# Patient Record
Sex: FEMALE | Race: ASIAN | NOT HISPANIC OR LATINO
[De-identification: names, ages, dates, MRNs, and addresses within clinical notes are randomized per-mention and may not be internally consistent; named-entity substitution may affect disease eponyms.]

---

## 2023-08-28 PROBLEM — Z00.00 ENCOUNTER FOR PREVENTIVE HEALTH EXAMINATION: Status: ACTIVE | Noted: 2023-08-28

## 2023-08-29 ENCOUNTER — APPOINTMENT (OUTPATIENT)
Dept: OBGYN | Facility: CLINIC | Age: 33
End: 2023-08-29
Payer: COMMERCIAL

## 2023-08-29 ENCOUNTER — NON-APPOINTMENT (OUTPATIENT)
Age: 33
End: 2023-08-29

## 2023-08-29 VITALS
HEIGHT: 62 IN | DIASTOLIC BLOOD PRESSURE: 81 MMHG | OXYGEN SATURATION: 100 % | BODY MASS INDEX: 21.53 KG/M2 | HEART RATE: 73 BPM | WEIGHT: 117 LBS | SYSTOLIC BLOOD PRESSURE: 121 MMHG

## 2023-08-29 DIAGNOSIS — Z78.9 OTHER SPECIFIED HEALTH STATUS: ICD-10-CM

## 2023-08-29 DIAGNOSIS — Z32.00 ENCOUNTER FOR PREGNANCY TEST, RESULT UNKNOWN: ICD-10-CM

## 2023-08-29 PROCEDURE — 76830 TRANSVAGINAL US NON-OB: CPT

## 2023-08-29 PROCEDURE — 99204 OFFICE O/P NEW MOD 45 MIN: CPT | Mod: 25

## 2023-08-29 PROCEDURE — 36415 COLL VENOUS BLD VENIPUNCTURE: CPT

## 2023-08-29 NOTE — HISTORY OF PRESENT ILLNESS
[Patient reported PAP Smear was normal] : Patient reported PAP Smear was normal [N] : Patient does not use contraception [Y] : Positive pregnancy history [Frequency: Q ___ days] : menstrual periods occur approximately every [unfilled] days [Menarche Age: ____] : age at menarche was [unfilled] [HCG+: ___(Date)] : a positive HCG was recorded on [unfilled] [No] : Patient does not have concerns regarding sex [Currently Active] : currently active [Men] : men [Vaginal] : vaginal [PapSmeardate] : 06/23 [LMPDate] : 07/07/23 [MensesFreq] : 28-30 [MensesLength] : 4-5 [PGxTotal] : 1 [FreeTextEntry1] : 07/07/23

## 2023-09-12 LAB
ABO + RH PNL BLD: NORMAL
ALBUMIN SERPL ELPH-MCNC: 5.2 G/DL
ALP BLD-CCNC: 37 U/L
ALT SERPL-CCNC: 10 U/L
ANION GAP SERPL CALC-SCNC: 17 MMOL/L
AR GENE MUT ANL BLD/T: NORMAL
AST SERPL-CCNC: 18 U/L
BACTERIA UR CULT: ABNORMAL
BILIRUB SERPL-MCNC: 0.5 MG/DL
BLD GP AB SCN SERPL QL: NORMAL
BUN SERPL-MCNC: 5 MG/DL
C TRACH RRNA SPEC QL NAA+PROBE: NOT DETECTED
CALCIUM SERPL-MCNC: 10.3 MG/DL
CFTR MUT TESTED BLD/T: NEGATIVE
CHLORIDE SERPL-SCNC: 102 MMOL/L
CMV IGG SERPL QL: >10 U/ML
CMV IGG SERPL-IMP: POSITIVE
CMV IGM SERPL QL: <8 AU/ML
CMV IGM SERPL QL: NEGATIVE
CO2 SERPL-SCNC: 20 MMOL/L
CREAT SERPL-MCNC: 0.57 MG/DL
EGFR: 125 ML/MIN/1.73M2
ESTIMATED AVERAGE GLUCOSE: 97 MG/DL
FMR1 GENE MUT ANL BLD/T: NORMAL
GLUCOSE SERPL-MCNC: 84 MG/DL
HBA1C MFR BLD HPLC: 5 %
HBV SURFACE AG SER QL: NONREACTIVE
HCV AB SER QL: NONREACTIVE
HCV S/CO RATIO: 0.24 S/CO
HGB A MFR BLD: 97.5 %
HGB A2 MFR BLD: 2.5 %
HGB FRACT BLD-IMP: NORMAL
HIV1+2 AB SPEC QL IA.RAPID: NONREACTIVE
MEV IGG FLD QL IA: 44 AU/ML
MEV IGG+IGM SER-IMP: POSITIVE
MUV AB SER-ACNC: POSITIVE
MUV IGG SER QL IA: >300 AU/ML
N GONORRHOEA RRNA SPEC QL NAA+PROBE: NOT DETECTED
POTASSIUM SERPL-SCNC: 4 MMOL/L
PROT SERPL-MCNC: 8.1 G/DL
RUBV IGG FLD-ACNC: 17 INDEX
RUBV IGG SER-IMP: POSITIVE
SODIUM SERPL-SCNC: 139 MMOL/L
SOURCE AMPLIFICATION: NORMAL
T PALLIDUM AB SER QL IA: NEGATIVE
TSH SERPL-ACNC: 0.93 UIU/ML
VZV AB TITR SER: POSITIVE
VZV IGG SER IF-ACNC: 2171 INDEX

## 2023-09-19 ENCOUNTER — APPOINTMENT (OUTPATIENT)
Dept: OBGYN | Facility: CLINIC | Age: 33
End: 2023-09-19
Payer: COMMERCIAL

## 2023-09-19 ENCOUNTER — TRANSCRIPTION ENCOUNTER (OUTPATIENT)
Age: 33
End: 2023-09-19

## 2023-09-19 VITALS
BODY MASS INDEX: 21.35 KG/M2 | OXYGEN SATURATION: 100 % | WEIGHT: 116 LBS | HEIGHT: 62 IN | SYSTOLIC BLOOD PRESSURE: 114 MMHG | DIASTOLIC BLOOD PRESSURE: 72 MMHG | HEART RATE: 86 BPM

## 2023-09-19 DIAGNOSIS — O23.40 UNSPECIFIED INFECTION OF URINARY TRACT IN PREGNANCY, UNSPECIFIED TRIMESTER: ICD-10-CM

## 2023-09-19 DIAGNOSIS — Z34.91 ENCOUNTER FOR SUPERVISION OF NORMAL PREGNANCY, UNSPECIFIED, FIRST TRIMESTER: ICD-10-CM

## 2023-09-19 PROCEDURE — 81003 URINALYSIS AUTO W/O SCOPE: CPT | Mod: NC,QW

## 2023-09-19 PROCEDURE — 36415 COLL VENOUS BLD VENIPUNCTURE: CPT

## 2023-09-19 PROCEDURE — 76830 TRANSVAGINAL US NON-OB: CPT

## 2023-09-19 PROCEDURE — 0500F INITIAL PRENATAL CARE VISIT: CPT | Mod: 25

## 2023-09-29 ENCOUNTER — APPOINTMENT (OUTPATIENT)
Dept: ANTEPARTUM | Facility: CLINIC | Age: 33
End: 2023-09-29
Payer: COMMERCIAL

## 2023-09-29 ENCOUNTER — ASOB RESULT (OUTPATIENT)
Age: 33
End: 2023-09-29

## 2023-09-29 PROCEDURE — 36415 COLL VENOUS BLD VENIPUNCTURE: CPT

## 2023-09-29 PROCEDURE — 76813 OB US NUCHAL MEAS 1 GEST: CPT

## 2023-09-29 PROCEDURE — 93976 VASCULAR STUDY: CPT

## 2023-10-01 ENCOUNTER — TRANSCRIPTION ENCOUNTER (OUTPATIENT)
Age: 33
End: 2023-10-01

## 2023-10-01 LAB — BACTERIA UR CULT: NORMAL

## 2023-10-06 PROBLEM — Z78.9 NO FAMILY HISTORY OF CANCER: Status: ACTIVE | Noted: 2023-10-06

## 2023-10-17 ENCOUNTER — APPOINTMENT (OUTPATIENT)
Dept: OBGYN | Facility: CLINIC | Age: 33
End: 2023-10-17
Payer: COMMERCIAL

## 2023-10-17 VITALS
DIASTOLIC BLOOD PRESSURE: 82 MMHG | OXYGEN SATURATION: 100 % | BODY MASS INDEX: 22.13 KG/M2 | SYSTOLIC BLOOD PRESSURE: 120 MMHG | HEART RATE: 83 BPM | WEIGHT: 121 LBS

## 2023-10-17 PROCEDURE — 81003 URINALYSIS AUTO W/O SCOPE: CPT | Mod: NC,QW

## 2023-10-17 PROCEDURE — 0502F SUBSEQUENT PRENATAL CARE: CPT

## 2023-10-27 ENCOUNTER — APPOINTMENT (OUTPATIENT)
Dept: OBGYN | Facility: CLINIC | Age: 33
End: 2023-10-27
Payer: COMMERCIAL

## 2023-10-27 VITALS
WEIGHT: 123 LBS | SYSTOLIC BLOOD PRESSURE: 111 MMHG | BODY MASS INDEX: 22.5 KG/M2 | OXYGEN SATURATION: 99 % | HEART RATE: 78 BPM | DIASTOLIC BLOOD PRESSURE: 67 MMHG

## 2023-10-27 DIAGNOSIS — Z13.79 ENCOUNTER FOR OTHER SCREENING FOR GENETIC AND CHROMOSOMAL ANOMALIES: ICD-10-CM

## 2023-10-27 PROCEDURE — 81003 URINALYSIS AUTO W/O SCOPE: CPT | Mod: NC,QW

## 2023-10-27 PROCEDURE — 0502F SUBSEQUENT PRENATAL CARE: CPT

## 2023-10-27 PROCEDURE — 36415 COLL VENOUS BLD VENIPUNCTURE: CPT

## 2023-11-06 LAB
AFP MOM: 1.11
AFP VALUE: 43 NG/ML
ALPHA FETOPROTEIN SERUM COMMENT: NORMAL
ALPHA FETOPROTEIN SERUM INTERPRETATION: NORMAL
ALPHA FETOPROTEIN SERUM RESULTS: NORMAL
ALPHA FETOPROTEIN SERUM TEST RESULTS: NORMAL
GESTATIONAL AGE BASED ON: NORMAL
GESTATIONAL AGE ON COLLECTION DATE: 16 WEEKS
INSULIN DEP DIABETES: NO
MATERNAL AGE AT EDD AFP: 31.5 YR
MULTIPLE GESTATION: NO
OSBR RISK 1 IN: 8647
RACE: NORMAL
WEIGHT AFP: 123 LBS

## 2023-11-27 ENCOUNTER — ASOB RESULT (OUTPATIENT)
Age: 33
End: 2023-11-27

## 2023-11-27 ENCOUNTER — APPOINTMENT (OUTPATIENT)
Dept: ANTEPARTUM | Facility: CLINIC | Age: 33
End: 2023-11-27
Payer: COMMERCIAL

## 2023-11-27 PROCEDURE — 76817 TRANSVAGINAL US OBSTETRIC: CPT

## 2023-11-27 PROCEDURE — 76811 OB US DETAILED SNGL FETUS: CPT

## 2023-12-01 ENCOUNTER — NON-APPOINTMENT (OUTPATIENT)
Age: 33
End: 2023-12-01

## 2023-12-05 ENCOUNTER — NON-APPOINTMENT (OUTPATIENT)
Age: 33
End: 2023-12-05

## 2023-12-05 ENCOUNTER — APPOINTMENT (OUTPATIENT)
Dept: OBGYN | Facility: CLINIC | Age: 33
End: 2023-12-05
Payer: COMMERCIAL

## 2023-12-05 VITALS
BODY MASS INDEX: 23.75 KG/M2 | OXYGEN SATURATION: 100 % | SYSTOLIC BLOOD PRESSURE: 105 MMHG | HEART RATE: 80 BPM | WEIGHT: 129.88 LBS | DIASTOLIC BLOOD PRESSURE: 68 MMHG

## 2023-12-05 PROCEDURE — 0502F SUBSEQUENT PRENATAL CARE: CPT

## 2024-01-08 ENCOUNTER — APPOINTMENT (OUTPATIENT)
Dept: ANTEPARTUM | Facility: CLINIC | Age: 34
End: 2024-01-08
Payer: COMMERCIAL

## 2024-01-08 ENCOUNTER — ASOB RESULT (OUTPATIENT)
Age: 34
End: 2024-01-08

## 2024-01-08 PROCEDURE — 76820 UMBILICAL ARTERY ECHO: CPT | Mod: 59

## 2024-01-08 PROCEDURE — 76819 FETAL BIOPHYS PROFIL W/O NST: CPT

## 2024-01-08 PROCEDURE — 76816 OB US FOLLOW-UP PER FETUS: CPT

## 2024-01-09 ENCOUNTER — APPOINTMENT (OUTPATIENT)
Dept: OBGYN | Facility: CLINIC | Age: 34
End: 2024-01-09
Payer: COMMERCIAL

## 2024-01-09 VITALS
BODY MASS INDEX: 24.88 KG/M2 | OXYGEN SATURATION: 100 % | DIASTOLIC BLOOD PRESSURE: 84 MMHG | HEART RATE: 75 BPM | WEIGHT: 136 LBS | SYSTOLIC BLOOD PRESSURE: 128 MMHG

## 2024-01-09 DIAGNOSIS — Z13.1 ENCOUNTER FOR SCREENING FOR DIABETES MELLITUS: ICD-10-CM

## 2024-01-09 PROCEDURE — 81003 URINALYSIS AUTO W/O SCOPE: CPT | Mod: NC,QW

## 2024-01-09 PROCEDURE — 0502F SUBSEQUENT PRENATAL CARE: CPT

## 2024-01-09 PROCEDURE — 36415 COLL VENOUS BLD VENIPUNCTURE: CPT

## 2024-01-13 ENCOUNTER — TRANSCRIPTION ENCOUNTER (OUTPATIENT)
Age: 34
End: 2024-01-13

## 2024-01-14 LAB
BASOPHILS # BLD AUTO: 0.03 K/UL
BASOPHILS NFR BLD AUTO: 0.3 %
EOSINOPHIL # BLD AUTO: 0.11 K/UL
EOSINOPHIL NFR BLD AUTO: 1.2 %
GLUCOSE 1H P 50 G GLC PO SERPL-MCNC: 107 MG/DL
HCT VFR BLD CALC: 34 %
HGB BLD-MCNC: 10.7 G/DL
IMM GRANULOCYTES NFR BLD AUTO: 0.6 %
LYMPHOCYTES # BLD AUTO: 1.4 K/UL
LYMPHOCYTES NFR BLD AUTO: 15.6 %
MAN DIFF?: NORMAL
MCHC RBC-ENTMCNC: 31.1 PG
MCHC RBC-ENTMCNC: 31.5 GM/DL
MCV RBC AUTO: 98.8 FL
MONOCYTES # BLD AUTO: 0.64 K/UL
MONOCYTES NFR BLD AUTO: 7.1 %
NEUTROPHILS # BLD AUTO: 6.74 K/UL
NEUTROPHILS NFR BLD AUTO: 75.2 %
PLATELET # BLD AUTO: 177 K/UL
RBC # BLD: 3.44 M/UL
RBC # FLD: 14.5 %
T PALLIDUM AB SER QL IA: NEGATIVE
WBC # FLD AUTO: 8.97 K/UL

## 2024-01-17 ENCOUNTER — TRANSCRIPTION ENCOUNTER (OUTPATIENT)
Age: 34
End: 2024-01-17

## 2024-01-24 ENCOUNTER — TRANSCRIPTION ENCOUNTER (OUTPATIENT)
Age: 34
End: 2024-01-24

## 2024-02-05 ENCOUNTER — APPOINTMENT (OUTPATIENT)
Dept: ANTEPARTUM | Facility: CLINIC | Age: 34
End: 2024-02-05
Payer: COMMERCIAL

## 2024-02-05 ENCOUNTER — ASOB RESULT (OUTPATIENT)
Age: 34
End: 2024-02-05

## 2024-02-05 PROCEDURE — 76816 OB US FOLLOW-UP PER FETUS: CPT

## 2024-02-05 PROCEDURE — 76820 UMBILICAL ARTERY ECHO: CPT | Mod: 59

## 2024-02-05 PROCEDURE — 76819 FETAL BIOPHYS PROFIL W/O NST: CPT

## 2024-02-06 ENCOUNTER — NON-APPOINTMENT (OUTPATIENT)
Age: 34
End: 2024-02-06

## 2024-02-14 ENCOUNTER — APPOINTMENT (OUTPATIENT)
Dept: OBGYN | Facility: CLINIC | Age: 34
End: 2024-02-14
Payer: COMMERCIAL

## 2024-02-14 VITALS
DIASTOLIC BLOOD PRESSURE: 79 MMHG | SYSTOLIC BLOOD PRESSURE: 128 MMHG | HEART RATE: 106 BPM | WEIGHT: 145 LBS | BODY MASS INDEX: 26.52 KG/M2 | OXYGEN SATURATION: 97 %

## 2024-02-14 DIAGNOSIS — Z23 ENCOUNTER FOR IMMUNIZATION: ICD-10-CM

## 2024-02-14 PROCEDURE — 90471 IMMUNIZATION ADMIN: CPT

## 2024-02-14 PROCEDURE — 90715 TDAP VACCINE 7 YRS/> IM: CPT

## 2024-02-14 PROCEDURE — 0502F SUBSEQUENT PRENATAL CARE: CPT

## 2024-02-14 PROCEDURE — 81003 URINALYSIS AUTO W/O SCOPE: CPT | Mod: QW

## 2024-02-26 ENCOUNTER — TRANSCRIPTION ENCOUNTER (OUTPATIENT)
Age: 34
End: 2024-02-26

## 2024-02-26 ENCOUNTER — APPOINTMENT (OUTPATIENT)
Dept: ANTEPARTUM | Facility: CLINIC | Age: 34
End: 2024-02-26
Payer: COMMERCIAL

## 2024-02-26 ENCOUNTER — ASOB RESULT (OUTPATIENT)
Age: 34
End: 2024-02-26

## 2024-02-26 PROCEDURE — 76820 UMBILICAL ARTERY ECHO: CPT | Mod: 59

## 2024-02-26 PROCEDURE — 76819 FETAL BIOPHYS PROFIL W/O NST: CPT

## 2024-02-26 PROCEDURE — 76816 OB US FOLLOW-UP PER FETUS: CPT

## 2024-02-29 ENCOUNTER — NON-APPOINTMENT (OUTPATIENT)
Age: 34
End: 2024-02-29

## 2024-03-05 ENCOUNTER — APPOINTMENT (OUTPATIENT)
Dept: OBGYN | Facility: CLINIC | Age: 34
End: 2024-03-05
Payer: COMMERCIAL

## 2024-03-05 VITALS
OXYGEN SATURATION: 97 % | DIASTOLIC BLOOD PRESSURE: 70 MMHG | HEIGHT: 62 IN | SYSTOLIC BLOOD PRESSURE: 125 MMHG | WEIGHT: 151 LBS | HEART RATE: 72 BPM | BODY MASS INDEX: 27.79 KG/M2

## 2024-03-05 PROCEDURE — 0502F SUBSEQUENT PRENATAL CARE: CPT

## 2024-03-05 PROCEDURE — 81003 URINALYSIS AUTO W/O SCOPE: CPT | Mod: QW

## 2024-03-13 ENCOUNTER — NON-APPOINTMENT (OUTPATIENT)
Age: 34
End: 2024-03-13

## 2024-03-14 ENCOUNTER — APPOINTMENT (OUTPATIENT)
Dept: ANTEPARTUM | Facility: CLINIC | Age: 34
End: 2024-03-14
Payer: COMMERCIAL

## 2024-03-14 ENCOUNTER — ASOB RESULT (OUTPATIENT)
Age: 34
End: 2024-03-14

## 2024-03-14 ENCOUNTER — TRANSCRIPTION ENCOUNTER (OUTPATIENT)
Age: 34
End: 2024-03-14

## 2024-03-14 PROCEDURE — 76819 FETAL BIOPHYS PROFIL W/O NST: CPT

## 2024-03-14 PROCEDURE — 76820 UMBILICAL ARTERY ECHO: CPT | Mod: 59

## 2024-03-14 PROCEDURE — 76816 OB US FOLLOW-UP PER FETUS: CPT

## 2024-03-17 PROBLEM — Z23 ENCOUNTER FOR IMMUNIZATION: Status: ACTIVE | Noted: 2024-03-17

## 2024-03-19 ENCOUNTER — APPOINTMENT (OUTPATIENT)
Dept: OBGYN | Facility: CLINIC | Age: 34
End: 2024-03-19
Payer: COMMERCIAL

## 2024-03-19 VITALS
HEART RATE: 72 BPM | WEIGHT: 150 LBS | DIASTOLIC BLOOD PRESSURE: 86 MMHG | OXYGEN SATURATION: 100 % | BODY MASS INDEX: 27.44 KG/M2 | SYSTOLIC BLOOD PRESSURE: 134 MMHG

## 2024-03-19 PROCEDURE — 81003 URINALYSIS AUTO W/O SCOPE: CPT | Mod: QW

## 2024-03-19 PROCEDURE — 0502F SUBSEQUENT PRENATAL CARE: CPT

## 2024-03-19 PROCEDURE — 36415 COLL VENOUS BLD VENIPUNCTURE: CPT

## 2024-03-20 LAB
ALBUMIN SERPL ELPH-MCNC: 3.8 G/DL
ALP BLD-CCNC: 180 U/L
ALT SERPL-CCNC: 30 U/L
ANION GAP SERPL CALC-SCNC: 12 MMOL/L
AST SERPL-CCNC: 28 U/L
BASOPHILS # BLD AUTO: 0.06 K/UL
BASOPHILS NFR BLD AUTO: 0.8 %
BILIRUB SERPL-MCNC: 0.3 MG/DL
BUN SERPL-MCNC: 6 MG/DL
CALCIUM SERPL-MCNC: 8.8 MG/DL
CHLORIDE SERPL-SCNC: 105 MMOL/L
CO2 SERPL-SCNC: 20 MMOL/L
CREAT SERPL-MCNC: 0.71 MG/DL
CREAT SPEC-SCNC: 93 MG/DL
CREAT/PROT UR: 0.4 RATIO
EGFR: 117 ML/MIN/1.73M2
EOSINOPHIL # BLD AUTO: 0.12 K/UL
EOSINOPHIL NFR BLD AUTO: 1.5 %
GLUCOSE SERPL-MCNC: 94 MG/DL
HBV SURFACE AG SER QL: NONREACTIVE
HCT VFR BLD CALC: 34.3 %
HGB BLD-MCNC: 10.3 G/DL
HIV1+2 AB SPEC QL IA.RAPID: NONREACTIVE
IMM GRANULOCYTES NFR BLD AUTO: 0.4 %
LYMPHOCYTES # BLD AUTO: 1.47 K/UL
LYMPHOCYTES NFR BLD AUTO: 18.6 %
MAN DIFF?: NORMAL
MCHC RBC-ENTMCNC: 27.5 PG
MCHC RBC-ENTMCNC: 30 GM/DL
MCV RBC AUTO: 91.7 FL
MONOCYTES # BLD AUTO: 0.56 K/UL
MONOCYTES NFR BLD AUTO: 7.1 %
NEUTROPHILS # BLD AUTO: 5.68 K/UL
NEUTROPHILS NFR BLD AUTO: 71.6 %
PLATELET # BLD AUTO: 217 K/UL
POTASSIUM SERPL-SCNC: 4.4 MMOL/L
PROT SERPL-MCNC: 6.9 G/DL
PROT UR-MCNC: 38 MG/DL
RBC # BLD: 3.74 M/UL
RBC # FLD: 13.9 %
SODIUM SERPL-SCNC: 137 MMOL/L
URATE SERPL-MCNC: 4.3 MG/DL
WBC # FLD AUTO: 7.92 K/UL

## 2024-03-21 ENCOUNTER — TRANSCRIPTION ENCOUNTER (OUTPATIENT)
Age: 34
End: 2024-03-21

## 2024-03-21 ENCOUNTER — INPATIENT (INPATIENT)
Facility: HOSPITAL | Age: 34
LOS: 2 days | Discharge: HOME | End: 2024-03-24
Attending: OBSTETRICS & GYNECOLOGY | Admitting: OBSTETRICS & GYNECOLOGY
Payer: COMMERCIAL

## 2024-03-21 VITALS
RESPIRATION RATE: 18 BRPM | SYSTOLIC BLOOD PRESSURE: 150 MMHG | DIASTOLIC BLOOD PRESSURE: 71 MMHG | TEMPERATURE: 99 F | HEART RATE: 85 BPM

## 2024-03-21 DIAGNOSIS — O26.899 OTHER SPECIFIED PREGNANCY RELATED CONDITIONS, UNSPECIFIED TRIMESTER: ICD-10-CM

## 2024-03-21 LAB
ALBUMIN SERPL ELPH-MCNC: 4 G/DL — SIGNIFICANT CHANGE UP (ref 3.3–5)
ALP SERPL-CCNC: 192 U/L — HIGH (ref 40–120)
ALT FLD-CCNC: 36 U/L — SIGNIFICANT CHANGE UP (ref 10–45)
ANION GAP SERPL CALC-SCNC: 12 MMOL/L — SIGNIFICANT CHANGE UP (ref 5–17)
APTT BLD: 27.5 SEC — SIGNIFICANT CHANGE UP (ref 24.5–35.6)
AST SERPL-CCNC: 31 U/L — SIGNIFICANT CHANGE UP (ref 10–40)
BASOPHILS # BLD AUTO: 0.03 K/UL — SIGNIFICANT CHANGE UP (ref 0–0.2)
BASOPHILS NFR BLD AUTO: 0.4 % — SIGNIFICANT CHANGE UP (ref 0–2)
BILIRUB SERPL-MCNC: 0.3 MG/DL — SIGNIFICANT CHANGE UP (ref 0.2–1.2)
BLD GP AB SCN SERPL QL: NEGATIVE — SIGNIFICANT CHANGE UP
BUN SERPL-MCNC: 6 MG/DL — LOW (ref 7–23)
CALCIUM SERPL-MCNC: 9.6 MG/DL — SIGNIFICANT CHANGE UP (ref 8.4–10.5)
CHLORIDE SERPL-SCNC: 104 MMOL/L — SIGNIFICANT CHANGE UP (ref 96–108)
CO2 SERPL-SCNC: 20 MMOL/L — LOW (ref 22–31)
CREAT ?TM UR-MCNC: 31 MG/DL — SIGNIFICANT CHANGE UP
CREAT SERPL-MCNC: 0.59 MG/DL — SIGNIFICANT CHANGE UP (ref 0.5–1.3)
EGFR: 123 ML/MIN/1.73M2 — SIGNIFICANT CHANGE UP
EOSINOPHIL # BLD AUTO: 0.06 K/UL — SIGNIFICANT CHANGE UP (ref 0–0.5)
EOSINOPHIL NFR BLD AUTO: 0.7 % — SIGNIFICANT CHANGE UP (ref 0–6)
FIBRINOGEN PPP-MCNC: 407 MG/DL — SIGNIFICANT CHANGE UP (ref 200–445)
GLUCOSE SERPL-MCNC: 92 MG/DL — SIGNIFICANT CHANGE UP (ref 70–99)
HCT VFR BLD CALC: 32.4 % — LOW (ref 34.5–45)
HGB BLD-MCNC: 10.4 G/DL — LOW (ref 11.5–15.5)
IMM GRANULOCYTES NFR BLD AUTO: 0.6 % — SIGNIFICANT CHANGE UP (ref 0–0.9)
INR BLD: 0.79 — LOW (ref 0.85–1.18)
LDH SERPL L TO P-CCNC: 191 U/L — SIGNIFICANT CHANGE UP (ref 50–242)
LYMPHOCYTES # BLD AUTO: 1.65 K/UL — SIGNIFICANT CHANGE UP (ref 1–3.3)
LYMPHOCYTES # BLD AUTO: 20.2 % — SIGNIFICANT CHANGE UP (ref 13–44)
MCHC RBC-ENTMCNC: 27.7 PG — SIGNIFICANT CHANGE UP (ref 27–34)
MCHC RBC-ENTMCNC: 32.1 GM/DL — SIGNIFICANT CHANGE UP (ref 32–36)
MCV RBC AUTO: 86.2 FL — SIGNIFICANT CHANGE UP (ref 80–100)
MONOCYTES # BLD AUTO: 0.58 K/UL — SIGNIFICANT CHANGE UP (ref 0–0.9)
MONOCYTES NFR BLD AUTO: 7.1 % — SIGNIFICANT CHANGE UP (ref 2–14)
NEUTROPHILS # BLD AUTO: 5.8 K/UL — SIGNIFICANT CHANGE UP (ref 1.8–7.4)
NEUTROPHILS NFR BLD AUTO: 71 % — SIGNIFICANT CHANGE UP (ref 43–77)
NRBC # BLD: 0 /100 WBCS — SIGNIFICANT CHANGE UP (ref 0–0)
PLATELET # BLD AUTO: 209 K/UL — SIGNIFICANT CHANGE UP (ref 150–400)
POTASSIUM SERPL-MCNC: 4.1 MMOL/L — SIGNIFICANT CHANGE UP (ref 3.5–5.3)
POTASSIUM SERPL-SCNC: 4.1 MMOL/L — SIGNIFICANT CHANGE UP (ref 3.5–5.3)
PROT ?TM UR-MCNC: 11 MG/DL — SIGNIFICANT CHANGE UP (ref 0–12)
PROT SERPL-MCNC: 7.5 G/DL — SIGNIFICANT CHANGE UP (ref 6–8.3)
PROT/CREAT UR-RTO: 0.4 RATIO — HIGH (ref 0–0.2)
PROTHROM AB SERPL-ACNC: 9.1 SEC — LOW (ref 9.5–13)
RBC # BLD: 3.76 M/UL — LOW (ref 3.8–5.2)
RBC # FLD: 13.6 % — SIGNIFICANT CHANGE UP (ref 10.3–14.5)
RH IG SCN BLD-IMP: POSITIVE — SIGNIFICANT CHANGE UP
RH IG SCN BLD-IMP: POSITIVE — SIGNIFICANT CHANGE UP
SODIUM SERPL-SCNC: 136 MMOL/L — SIGNIFICANT CHANGE UP (ref 135–145)
URATE SERPL-MCNC: 4.4 MG/DL — SIGNIFICANT CHANGE UP (ref 2.5–7)
WBC # BLD: 8.17 K/UL — SIGNIFICANT CHANGE UP (ref 3.8–10.5)
WBC # FLD AUTO: 8.17 K/UL — SIGNIFICANT CHANGE UP (ref 3.8–10.5)

## 2024-03-21 RX ORDER — MAGNESIUM SULFATE 500 MG/ML
4 VIAL (ML) INJECTION ONCE
Refills: 0 | Status: COMPLETED | OUTPATIENT
Start: 2024-03-21 | End: 2024-03-21

## 2024-03-21 RX ORDER — CITRIC ACID/SODIUM CITRATE 300-500 MG
15 SOLUTION, ORAL ORAL EVERY 6 HOURS
Refills: 0 | Status: DISCONTINUED | OUTPATIENT
Start: 2024-03-21 | End: 2024-03-22

## 2024-03-21 RX ORDER — MAGNESIUM SULFATE 500 MG/ML
2 VIAL (ML) INJECTION
Qty: 40 | Refills: 0 | Status: DISCONTINUED | OUTPATIENT
Start: 2024-03-21 | End: 2024-03-22

## 2024-03-21 RX ORDER — FENTANYL/BUPIVACAINE/NS/PF 2MCG/ML-.1
250 PLASTIC BAG, INJECTION (ML) INJECTION
Refills: 0 | Status: DISCONTINUED | OUTPATIENT
Start: 2024-03-21 | End: 2024-03-21

## 2024-03-21 RX ORDER — SODIUM CHLORIDE 9 MG/ML
1000 INJECTION, SOLUTION INTRAVENOUS
Refills: 0 | Status: DISCONTINUED | OUTPATIENT
Start: 2024-03-21 | End: 2024-03-22

## 2024-03-21 RX ORDER — CHLORHEXIDINE GLUCONATE 213 G/1000ML
1 SOLUTION TOPICAL DAILY
Refills: 0 | Status: DISCONTINUED | OUTPATIENT
Start: 2024-03-21 | End: 2024-03-22

## 2024-03-21 RX ORDER — OXYTOCIN 10 UNIT/ML
VIAL (ML) INJECTION
Qty: 30 | Refills: 0 | Status: DISCONTINUED | OUTPATIENT
Start: 2024-03-21 | End: 2024-03-22

## 2024-03-21 RX ORDER — METOCLOPRAMIDE HCL 10 MG
10 TABLET ORAL ONCE
Refills: 0 | Status: COMPLETED | OUTPATIENT
Start: 2024-03-21 | End: 2024-03-21

## 2024-03-21 RX ORDER — ACETAMINOPHEN 500 MG
1000 TABLET ORAL ONCE
Refills: 0 | Status: COMPLETED | OUTPATIENT
Start: 2024-03-21 | End: 2024-03-21

## 2024-03-21 RX ORDER — OXYTOCIN 10 UNIT/ML
333.33 VIAL (ML) INJECTION
Qty: 20 | Refills: 0 | Status: DISCONTINUED | OUTPATIENT
Start: 2024-03-21 | End: 2024-03-22

## 2024-03-21 RX ADMIN — SODIUM CHLORIDE 125 MILLILITER(S): 9 INJECTION, SOLUTION INTRAVENOUS at 14:57

## 2024-03-21 RX ADMIN — Medication 400 MILLIGRAM(S): at 19:30

## 2024-03-21 RX ADMIN — Medication 1000 MILLIGRAM(S): at 20:04

## 2024-03-21 RX ADMIN — Medication 2 MILLIUNIT(S)/MIN: at 14:58

## 2024-03-21 RX ADMIN — Medication 300 GRAM(S): at 22:20

## 2024-03-21 RX ADMIN — Medication 10 MILLIGRAM(S): at 20:28

## 2024-03-21 RX ADMIN — Medication 50 GM/HR: at 23:45

## 2024-03-21 NOTE — OB PROVIDER H&P - HISTORY OF PRESENT ILLNESS
CHESTER [id number]  S: 33yFemale  at 36w6d presenting with elevated BPs. Pt states on  she had a BP of 130/80 in the office She had bloodwork and urine sent and was told to monitor BPs at home. Last night BPs were elevated to 150s/90s and 140/90s today. Pt reports +FM, denies contractions, LOF, ROM, and vaginal bleeding. She denies HA, blurry vision, scotoma, RUQ/epigastric pain.    Antepartum  Spontaneous, NIPT wnl, high risk for pre eclampsia, anatomy scan wnl, passed GCT, GBS unknown, EFW 2900g  Bloodwork on 3/19 wnl, P/C 0.4    Ob:   GYN: denies hx of abnormal paps, ovarian cysts, uterine fibroids, STIs  PMHx: denies  Surg: denies  Meds: baby ASA until 36wk, iron, PNV  No Known Allergies      PE  /102  General: NAD  Abdomen: Soft; Nontender; Gravid  SVE: 2-3/50/-3    NST: baseline , mod variability, +accels, -decels, reactive and reasurring  [place]: contractions every 5-6min  TAUS: cephalic presentation, anterior placenta, RICARDO 14.6, BPP 8/8 (US attached)

## 2024-03-21 NOTE — OB PROVIDER H&P - ASSESSMENT
33y  at 36w6d presenting with elevated BPs, now with pre eclampsia without severe features being admitted for IOL, approved by [facility]. P/C ratio on 3/19 was 0.4, today 0.4. Bloodwork wnl. BPs 136-175/ (severe range x 2 but not sustained, repeat BP mild range). No toxic complaints. Reactive and reasurring FHT with BPP 8/8. SVE 2-3/50/-3.  - Admit to L&D  - NPO and IVF  - Routine labs  - GBS unknown, antibiotics not necessary as pt does not have any risk factors  - Fetal status reassuring, continuous efm and toco  - Cont to monitor BPs  - Pt is aware of toxic symptoms and will notify the team if she develops them   - Plan for IOL with pitocin    D/w Dr. Pérez

## 2024-03-21 NOTE — OB PROVIDER TRIAGE NOTE - HISTORY OF PRESENT ILLNESS
CHESTER [id number]  S: 33yFemale  at 36w6d presenting with elevated BPs. Pt states on  she had mild range BPs in the office and was told to monitor BPs at home. Today BP was 150/96 at home.  Pt reports +FM, denies contractions, LOF, ROM, and vaginal bleeding. She denies HA, blurry vision, scotoma, RUQ/epigastric pain.    Antepartum  Spontaneous, NIPT wnl, high risk for pre eclampsia, anatomy scan wnl, passed GCT, GBS unknown, EFW 2900g    Ob:   GYN: denies hx of abnormal paps, ovarian cysts, uterine fibroids, STIs  PMHx: denies  Surg: denies  Meds: baby ASA until 36wk, iron, PNV  No Known Allergies      PE  /102  General: NAD  Abdomen: Soft; Nontender; Gravid  SVE:     NST: baseline , mod variability, +accels, -decels, reactive and reasurring  [place]: contractions every 5-6min  TAUS:        CHESTER [id number]  S: 33yFemale  at 36w6d presenting with elevated BPs. Pt states on  she had a BP of 130/80 in the office She had bloodwork and urine sent and was told to monitor BPs at home. Last night BPs were elevated to 150s/90s and 140/90s today. Pt reports +FM, denies contractions, LOF, ROM, and vaginal bleeding. She denies HA, blurry vision, scotoma, RUQ/epigastric pain.    Antepartum  Spontaneous, NIPT wnl, high risk for pre eclampsia, anatomy scan wnl, passed GCT, GBS unknown, EFW 2900g  Bloodwork on 3/19 wnl, P/C 0.4    Ob:   GYN: denies hx of abnormal paps, ovarian cysts, uterine fibroids, STIs  PMHx: denies  Surg: denies  Meds: baby ASA until 36wk, iron, PNV  No Known Allergies      PE  /102  General: NAD  Abdomen: Soft; Nontender; Gravid  SVE:     NST: baseline , mod variability, +accels, -decels, reactive and reasurring  [place]: contractions every 5-6min  TAUS:        CHESTER [id number]  S: 33yFemale  at 36w6d presenting with elevated BPs. Pt states on  she had a BP of 130/80 in the office She had bloodwork and urine sent and was told to monitor BPs at home. Last night BPs were elevated to 150s/90s and 140/90s today. Pt reports +FM, denies contractions, LOF, ROM, and vaginal bleeding. She denies HA, blurry vision, scotoma, RUQ/epigastric pain.    Antepartum  Spontaneous, NIPT wnl, high risk for pre eclampsia, anatomy scan wnl, passed GCT, GBS unknown, EFW 2900g  Bloodwork on 3/19 wnl, P/C 0.4    Ob:   GYN: denies hx of abnormal paps, ovarian cysts, uterine fibroids, STIs  PMHx: denies  Surg: denies  Meds: baby ASA until 36wk, iron, PNV  No Known Allergies      PE  /102  General: NAD  Abdomen: Soft; Nontender; Gravid  SVE:     NST: baseline , mod variability, +accels, -decels, reactive and reasurring  [place]: contractions every 5-6min  TAUS: cephalic presentation, anterior placenta, RICARDO 14.6, BPP 8/8 (US attached)

## 2024-03-21 NOTE — OB RN PATIENT PROFILE - NS PRO RUBELLA RECEIVED Y/N
Problem: VTE, Risk for  Goal: # No s/s of VTE  Outcome: Outcome Met, Continue evaluating goal progress toward completion     Problem: At Risk for Falls  Goal: # Patient does not fall  Outcome: Outcome Met, Continue evaluating goal progress toward completion     Problem: Risk for Infection re: Immunocompromise  Goal: # Patient free of symptoms of infection  Description: e.g. absence of fever/chills, Vital Signs are maintained within parameters  Outcome: Outcome Met, Continue evaluating goal progress toward completion  Flowsheets  Taken 3/9/2021 0554 by Janessa Shaw RN  BP: 138/78  Pulse: 109 !  Taken 3/9/2021 0505 by Jocelin Troy CNA  Temp: 97.7 °F (36.5 °C)  Resp: 18  SpO2: 95 %     Problem: Pain  Goal: #Acceptable pain level achieved/maintained at rest using NRS/Faces  Description: This goal is used for patients who can self-report.  Acceptable means the level is at or below the identified comfort/function goal.  Outcome: Outcome Met, Continue evaluating goal progress toward completion      no...

## 2024-03-21 NOTE — OB PROVIDER TRIAGE NOTE - NSOBPROVIDERNOTE_OBGYN_ALL_OB_FT
33yFemale  at 36w6d presenting with elevated BPs, now with pre eclampsia without severe features. P/C ratio on 3/19 was 0.4, with mild range BPs today. No toxic complaints.  [] full HTN labs  [] cont to monitor BPs 33yFemale  at 36w6d presenting with elevated BPs, now with pre eclampsia without severe features. P/C ratio on 3/19 was 0.4, with mild range BPs today. No toxic complaints. Reactive and reasurring FHT with BPP 8/8.  [] full HTN labs  [] cont to monitor BPs 33yFemale  at 36w6d presenting with elevated BPs, now with pre eclampsia without severe features. P/C ratio on 3/19 was 0.4, with mild range BPs today. No toxic complaints. Reactive and reasurring FHT with BPP 8/8.  [] full HTN labs  [] cont to monitor BPs    D/w Dr. Pérez

## 2024-03-22 LAB
ADD ON TEST-SPECIMEN IN LAB: SIGNIFICANT CHANGE UP
ALBUMIN SERPL ELPH-MCNC: 3.2 G/DL — LOW (ref 3.3–5)
ALBUMIN SERPL ELPH-MCNC: 3.5 G/DL — SIGNIFICANT CHANGE UP (ref 3.3–5)
ALP SERPL-CCNC: 146 U/L — HIGH (ref 40–120)
ALP SERPL-CCNC: 172 U/L — HIGH (ref 40–120)
ALT FLD-CCNC: 38 U/L — SIGNIFICANT CHANGE UP (ref 10–45)
ALT FLD-CCNC: 39 U/L — SIGNIFICANT CHANGE UP (ref 10–45)
ANION GAP SERPL CALC-SCNC: 14 MMOL/L — SIGNIFICANT CHANGE UP (ref 5–17)
ANION GAP SERPL CALC-SCNC: 8 MMOL/L — SIGNIFICANT CHANGE UP (ref 5–17)
AST SERPL-CCNC: 34 U/L — SIGNIFICANT CHANGE UP (ref 10–40)
AST SERPL-CCNC: 37 U/L — SIGNIFICANT CHANGE UP (ref 10–40)
BASOPHILS # BLD AUTO: 0.03 K/UL — SIGNIFICANT CHANGE UP (ref 0–0.2)
BASOPHILS NFR BLD AUTO: 0.2 % — SIGNIFICANT CHANGE UP (ref 0–2)
BILIRUB SERPL-MCNC: 0.2 MG/DL — SIGNIFICANT CHANGE UP (ref 0.2–1.2)
BILIRUB SERPL-MCNC: 0.4 MG/DL — SIGNIFICANT CHANGE UP (ref 0.2–1.2)
BUN SERPL-MCNC: 5 MG/DL — LOW (ref 7–23)
BUN SERPL-MCNC: 6 MG/DL — LOW (ref 7–23)
CALCIUM SERPL-MCNC: 6.7 MG/DL — LOW (ref 8.4–10.5)
CALCIUM SERPL-MCNC: 7.8 MG/DL — LOW (ref 8.4–10.5)
CHLORIDE SERPL-SCNC: 107 MMOL/L — SIGNIFICANT CHANGE UP (ref 96–108)
CHLORIDE SERPL-SCNC: 107 MMOL/L — SIGNIFICANT CHANGE UP (ref 96–108)
CO2 SERPL-SCNC: 17 MMOL/L — LOW (ref 22–31)
CO2 SERPL-SCNC: 23 MMOL/L — SIGNIFICANT CHANGE UP (ref 22–31)
CREAT SERPL-MCNC: 0.57 MG/DL — SIGNIFICANT CHANGE UP (ref 0.5–1.3)
CREAT SERPL-MCNC: 0.61 MG/DL — SIGNIFICANT CHANGE UP (ref 0.5–1.3)
EGFR: 122 ML/MIN/1.73M2 — SIGNIFICANT CHANGE UP
EGFR: 125 ML/MIN/1.73M2 — SIGNIFICANT CHANGE UP
EOSINOPHIL # BLD AUTO: 0 K/UL — SIGNIFICANT CHANGE UP (ref 0–0.5)
EOSINOPHIL NFR BLD AUTO: 0 % — SIGNIFICANT CHANGE UP (ref 0–6)
FIBRINOGEN PPP-MCNC: 415 MG/DL — SIGNIFICANT CHANGE UP (ref 200–445)
GLUCOSE SERPL-MCNC: 126 MG/DL — HIGH (ref 70–99)
GLUCOSE SERPL-MCNC: 162 MG/DL — HIGH (ref 70–99)
HCT VFR BLD CALC: 25.3 % — LOW (ref 34.5–45)
HCT VFR BLD CALC: 29.5 % — LOW (ref 34.5–45)
HGB BLD-MCNC: 8 G/DL — LOW (ref 11.5–15.5)
HGB BLD-MCNC: 9.2 G/DL — LOW (ref 11.5–15.5)
IMM GRANULOCYTES NFR BLD AUTO: 0.4 % — SIGNIFICANT CHANGE UP (ref 0–0.9)
LYMPHOCYTES # BLD AUTO: 0.66 K/UL — LOW (ref 1–3.3)
LYMPHOCYTES # BLD AUTO: 3.8 % — LOW (ref 13–44)
MAGNESIUM SERPL-MCNC: 5.6 MG/DL — HIGH (ref 1.6–2.6)
MAGNESIUM SERPL-MCNC: 6.1 MG/DL — HIGH (ref 1.6–2.6)
MAGNESIUM SERPL-MCNC: 6.2 MG/DL — HIGH (ref 1.6–2.6)
MAGNESIUM SERPL-MCNC: 6.8 MG/DL — HIGH (ref 1.6–2.6)
MCHC RBC-ENTMCNC: 27.9 PG — SIGNIFICANT CHANGE UP (ref 27–34)
MCHC RBC-ENTMCNC: 28.1 PG — SIGNIFICANT CHANGE UP (ref 27–34)
MCHC RBC-ENTMCNC: 31.2 GM/DL — LOW (ref 32–36)
MCHC RBC-ENTMCNC: 31.6 GM/DL — LOW (ref 32–36)
MCV RBC AUTO: 88.2 FL — SIGNIFICANT CHANGE UP (ref 80–100)
MCV RBC AUTO: 90.2 FL — SIGNIFICANT CHANGE UP (ref 80–100)
MONOCYTES # BLD AUTO: 0.48 K/UL — SIGNIFICANT CHANGE UP (ref 0–0.9)
MONOCYTES NFR BLD AUTO: 2.7 % — SIGNIFICANT CHANGE UP (ref 2–14)
NEUTROPHILS # BLD AUTO: 16.27 K/UL — HIGH (ref 1.8–7.4)
NEUTROPHILS NFR BLD AUTO: 92.9 % — HIGH (ref 43–77)
NRBC # BLD: 0 /100 WBCS — SIGNIFICANT CHANGE UP (ref 0–0)
NRBC # BLD: 0 /100 WBCS — SIGNIFICANT CHANGE UP (ref 0–0)
PLATELET # BLD AUTO: 191 K/UL — SIGNIFICANT CHANGE UP (ref 150–400)
PLATELET # BLD AUTO: 199 K/UL — SIGNIFICANT CHANGE UP (ref 150–400)
POTASSIUM SERPL-MCNC: 4.1 MMOL/L — SIGNIFICANT CHANGE UP (ref 3.5–5.3)
POTASSIUM SERPL-MCNC: 4.5 MMOL/L — SIGNIFICANT CHANGE UP (ref 3.5–5.3)
POTASSIUM SERPL-SCNC: 4.1 MMOL/L — SIGNIFICANT CHANGE UP (ref 3.5–5.3)
POTASSIUM SERPL-SCNC: 4.5 MMOL/L — SIGNIFICANT CHANGE UP (ref 3.5–5.3)
PROT SERPL-MCNC: 5.7 G/DL — LOW (ref 6–8.3)
PROT SERPL-MCNC: 6.2 G/DL — SIGNIFICANT CHANGE UP (ref 6–8.3)
RBC # BLD: 2.87 M/UL — LOW (ref 3.8–5.2)
RBC # BLD: 3.27 M/UL — LOW (ref 3.8–5.2)
RBC # FLD: 13.7 % — SIGNIFICANT CHANGE UP (ref 10.3–14.5)
RBC # FLD: 13.9 % — SIGNIFICANT CHANGE UP (ref 10.3–14.5)
SODIUM SERPL-SCNC: 138 MMOL/L — SIGNIFICANT CHANGE UP (ref 135–145)
SODIUM SERPL-SCNC: 138 MMOL/L — SIGNIFICANT CHANGE UP (ref 135–145)
T PALLIDUM AB TITR SER: NEGATIVE — SIGNIFICANT CHANGE UP
WBC # BLD: 13.53 K/UL — HIGH (ref 3.8–10.5)
WBC # BLD: 17.51 K/UL — HIGH (ref 3.8–10.5)
WBC # FLD AUTO: 13.53 K/UL — HIGH (ref 3.8–10.5)
WBC # FLD AUTO: 17.51 K/UL — HIGH (ref 3.8–10.5)

## 2024-03-22 PROCEDURE — 59400 OBSTETRICAL CARE: CPT

## 2024-03-22 RX ORDER — IBUPROFEN 200 MG
600 TABLET ORAL EVERY 6 HOURS
Refills: 0 | Status: COMPLETED | OUTPATIENT
Start: 2024-03-22 | End: 2025-02-18

## 2024-03-22 RX ORDER — BENZOCAINE 10 %
1 GEL (GRAM) MUCOUS MEMBRANE EVERY 6 HOURS
Refills: 0 | Status: DISCONTINUED | OUTPATIENT
Start: 2024-03-22 | End: 2024-03-24

## 2024-03-22 RX ORDER — SODIUM CHLORIDE 9 MG/ML
1000 INJECTION, SOLUTION INTRAVENOUS
Refills: 0 | Status: DISCONTINUED | OUTPATIENT
Start: 2024-03-22 | End: 2024-03-23

## 2024-03-22 RX ORDER — DIPHENHYDRAMINE HCL 50 MG
25 CAPSULE ORAL EVERY 6 HOURS
Refills: 0 | Status: DISCONTINUED | OUTPATIENT
Start: 2024-03-22 | End: 2024-03-24

## 2024-03-22 RX ORDER — MAGNESIUM HYDROXIDE 400 MG/1
30 TABLET, CHEWABLE ORAL
Refills: 0 | Status: DISCONTINUED | OUTPATIENT
Start: 2024-03-22 | End: 2024-03-24

## 2024-03-22 RX ORDER — PRAMOXINE HYDROCHLORIDE 150 MG/15G
1 AEROSOL, FOAM RECTAL EVERY 4 HOURS
Refills: 0 | Status: DISCONTINUED | OUTPATIENT
Start: 2024-03-22 | End: 2024-03-24

## 2024-03-22 RX ORDER — OXYCODONE HYDROCHLORIDE 5 MG/1
5 TABLET ORAL
Refills: 0 | Status: DISCONTINUED | OUTPATIENT
Start: 2024-03-22 | End: 2024-03-24

## 2024-03-22 RX ORDER — ACETAMINOPHEN 500 MG
975 TABLET ORAL
Refills: 0 | Status: DISCONTINUED | OUTPATIENT
Start: 2024-03-22 | End: 2024-03-24

## 2024-03-22 RX ORDER — SIMETHICONE 80 MG/1
80 TABLET, CHEWABLE ORAL EVERY 4 HOURS
Refills: 0 | Status: DISCONTINUED | OUTPATIENT
Start: 2024-03-22 | End: 2024-03-24

## 2024-03-22 RX ORDER — KETOROLAC TROMETHAMINE 30 MG/ML
30 SYRINGE (ML) INJECTION ONCE
Refills: 0 | Status: DISCONTINUED | OUTPATIENT
Start: 2024-03-22 | End: 2024-03-22

## 2024-03-22 RX ORDER — LANOLIN
1 OINTMENT (GRAM) TOPICAL EVERY 6 HOURS
Refills: 0 | Status: DISCONTINUED | OUTPATIENT
Start: 2024-03-22 | End: 2024-03-24

## 2024-03-22 RX ORDER — IBUPROFEN 200 MG
600 TABLET ORAL EVERY 6 HOURS
Refills: 0 | Status: DISCONTINUED | OUTPATIENT
Start: 2024-03-22 | End: 2024-03-24

## 2024-03-22 RX ORDER — OXYCODONE HYDROCHLORIDE 5 MG/1
5 TABLET ORAL ONCE
Refills: 0 | Status: DISCONTINUED | OUTPATIENT
Start: 2024-03-22 | End: 2024-03-24

## 2024-03-22 RX ORDER — AER TRAVELER 0.5 G/1
1 SOLUTION RECTAL; TOPICAL EVERY 4 HOURS
Refills: 0 | Status: DISCONTINUED | OUTPATIENT
Start: 2024-03-22 | End: 2024-03-24

## 2024-03-22 RX ORDER — SODIUM CHLORIDE 9 MG/ML
3 INJECTION INTRAMUSCULAR; INTRAVENOUS; SUBCUTANEOUS EVERY 8 HOURS
Refills: 0 | Status: DISCONTINUED | OUTPATIENT
Start: 2024-03-22 | End: 2024-03-24

## 2024-03-22 RX ORDER — TETANUS TOXOID, REDUCED DIPHTHERIA TOXOID AND ACELLULAR PERTUSSIS VACCINE, ADSORBED 5; 2.5; 8; 8; 2.5 [IU]/.5ML; [IU]/.5ML; UG/.5ML; UG/.5ML; UG/.5ML
0.5 SUSPENSION INTRAMUSCULAR ONCE
Refills: 0 | Status: DISCONTINUED | OUTPATIENT
Start: 2024-03-22 | End: 2024-03-24

## 2024-03-22 RX ORDER — HYDROCORTISONE 1 %
1 OINTMENT (GRAM) TOPICAL EVERY 6 HOURS
Refills: 0 | Status: DISCONTINUED | OUTPATIENT
Start: 2024-03-22 | End: 2024-03-24

## 2024-03-22 RX ORDER — MAGNESIUM SULFATE 500 MG/ML
1.5 VIAL (ML) INJECTION
Qty: 40 | Refills: 0 | Status: DISCONTINUED | OUTPATIENT
Start: 2024-03-22 | End: 2024-03-23

## 2024-03-22 RX ORDER — DIBUCAINE 1 %
1 OINTMENT (GRAM) RECTAL EVERY 6 HOURS
Refills: 0 | Status: DISCONTINUED | OUTPATIENT
Start: 2024-03-22 | End: 2024-03-24

## 2024-03-22 RX ORDER — OXYTOCIN 10 UNIT/ML
41.67 VIAL (ML) INJECTION
Qty: 20 | Refills: 0 | Status: DISCONTINUED | OUTPATIENT
Start: 2024-03-22 | End: 2024-03-24

## 2024-03-22 RX ADMIN — SODIUM CHLORIDE 87.5 MILLILITER(S): 9 INJECTION, SOLUTION INTRAVENOUS at 12:39

## 2024-03-22 RX ADMIN — SODIUM CHLORIDE 87.5 MILLILITER(S): 9 INJECTION, SOLUTION INTRAVENOUS at 20:00

## 2024-03-22 RX ADMIN — SODIUM CHLORIDE 3 MILLILITER(S): 9 INJECTION INTRAMUSCULAR; INTRAVENOUS; SUBCUTANEOUS at 14:05

## 2024-03-22 RX ADMIN — Medication 37.5 GM/HR: at 20:00

## 2024-03-22 RX ADMIN — Medication 30 MILLIGRAM(S): at 05:55

## 2024-03-22 RX ADMIN — Medication 37.5 GM/HR: at 12:38

## 2024-03-22 RX ADMIN — Medication 600 MILLIGRAM(S): at 11:30

## 2024-03-22 RX ADMIN — Medication 600 MILLIGRAM(S): at 18:14

## 2024-03-22 RX ADMIN — Medication 30 MILLIGRAM(S): at 05:27

## 2024-03-22 RX ADMIN — Medication 975 MILLIGRAM(S): at 20:53

## 2024-03-22 RX ADMIN — Medication 975 MILLIGRAM(S): at 14:42

## 2024-03-22 RX ADMIN — SODIUM CHLORIDE 75 MILLILITER(S): 9 INJECTION, SOLUTION INTRAVENOUS at 11:30

## 2024-03-22 RX ADMIN — Medication 1 TABLET(S): at 11:29

## 2024-03-22 RX ADMIN — Medication 50 GM/HR: at 11:35

## 2024-03-22 RX ADMIN — SODIUM CHLORIDE 3 MILLILITER(S): 9 INJECTION INTRAMUSCULAR; INTRAVENOUS; SUBCUTANEOUS at 22:00

## 2024-03-22 NOTE — OB PROVIDER LABOR PROGRESS NOTE - ASSESSMENT
- Category 1   - Pitocin currently at 4mu. Will continue to titrate pitocin as tolerated. 
- Category I tracing undergoing induction for preeclampsia without severe features  - Pitocin 12mU/min; continue to titrate as needed to attain adequate contractions  - Epidural as needed for analgesia  - Consider amniotomy with next exam ~2100
- Category I tracing with adequate contractions  - Induction with pitocin and now s/p amniotomy  - Pitocin 16mU/min; continue to titrate as needed to maintain adequate contractions  - Epidural as needed
- Category II tracing in latent labor with inadequate contractions per frequency  - Continue to reposition and resuscitate as needed  - COnsider IUPC with amnioinfusion if recurrent variable decels  - Pitocin 14mU/min; continue to titrate as needed to attain adequate contractions but maintain overall reassuring fetal status
- Category I tracing with adequate contractions  - Pitocin 14mU/min; continue to titrate as needed to maintain adequate contractions  - BPs recently normotensive, patient denies toxic symptoms  - Continue IV Mg 2g/hr; next Mg draw 0430; follow level
- Category I tracing with inadequate contractions  - Pitocin 14mU/min; continue to titrate as needed to attain adequate contractions  - Epidural as needed for analgesia
- category 1   - Pitocin currently at 10mu. Will titrate as tolerated. Will monitor closely. 
Pitocin at 10mu. Consider AROM with next exam.

## 2024-03-22 NOTE — OB PROVIDER LABOR PROGRESS NOTE - NS_SUBJECTIVE/OBJECTIVE_OBGYN_ALL_OB_FT
EFM reviewed
Patient seen at bedside  SVE 5/90/-1  Patient endorsing increased abdominal pressure  She is requesting an anesthesia topoff, which was given by OB anesthesiology while in room
Patient seen at bedside  IV Tylenol and IV Reglan given for previously reported headache with good response  Patient denies headache, changes in vision, RUQ pain
Pt examined, SVE 3/70/-3.
Patient seen at bedside  SVE 3/70/-3 (unchanged from prior)  Amniotomy performed at 2115 with clear fluid expressed
EFM reviewed

## 2024-03-22 NOTE — OB PROVIDER DELIVERY SUMMARY - NSPROVIDERDELIVERYNOTE_OBGYN_ALL_OB_FT
Patient 34 yo  at 36w6d presents for induction of labor for preeclampsia without severe features yet subsequently ruled in for preeclampsia with severe features  She was induced with pitocin at 1500 on 3/21/24, received an epidural for analgesia, and underwent amniotomy at 2115  Became fully dilated at 0300  Experienced  at   Delivered spontaneously a  Placenta delivered spontaneously intact with 3VC.  Cervix was inspected and found intact.  A laceration was repaired with  Hemostasis achieved.  EBL   Patient tolerated procedure well.  Patient and infant in stable conditions.   present throughout procedure. Patient 34 yo  at 36w6d presents for induction of labor for preeclampsia without severe features yet subsequently ruled in for preeclampsia with severe features  She was induced with pitocin at 1500 on 3/21/24, received an epidural for analgesia, and underwent amniotomy at 2115  Became fully dilated at 0300  Experienced  at 0341  Delivered spontaneously a vigorous male infant in GAL position with APGARs 9/9  Placenta delivered spontaneously intact with 3VC.  Cervix was inspected and found intact.  A second degree laceration was repaired with 2-0 Vicryl in a running locked, running, then subcuticular fashion  A left labial abrasion was re-approximated with 3-0 Chromic in a running fashion  Hemostasis achieved.  EBL 250cc  Patient tolerated procedure well.  Patient and infant in stable conditions.  Dr. Mantilla present throughout procedure.

## 2024-03-22 NOTE — OB PROVIDER DELIVERY SUMMARY - NSLOWPPHRISK_OBGYN_A_OB
No previous uterine incision/[place] Pregnancy/Less than or equal to 4 previous vaginal births/No known bleeding disorder/No history of postpartum hemorrhage

## 2024-03-22 NOTE — OB PROVIDER LABOR PROGRESS NOTE - NS_OBIHICONTRACTIONPATTERNDETAILS_OBGYN_ALL_OB_FT
[place] 4 in 10 minutes.
irregular contractions 4 in 10min
[place] 2 in 10 minutes.
irregular contractions 3-4 in 10min
irregular contractions 3-4 in 10min
contractions q2-4min
irregular contractions 3-4 in 10min
irregular contractions 4 in 10min

## 2024-03-22 NOTE — OB RN DELIVERY SUMMARY - NS_SEPSISRSKCALC_OBGYN_ALL_OB_FT
EOS calculated successfully. EOS Risk Factor: 0.5/1000 live births ([facility] national incidence); GA=37w;Temp=99; ROM=6.4; GBS='Unknown'; Antibiotics='No antibiotics or any antibiotics < 2 hrs prior to birth'

## 2024-03-22 NOTE — OB PROVIDER LABOR PROGRESS NOTE - NS_OBIHIFHRDETAILS_OBGYN_ALL_OB_FT
baseline , mod variability, +accels, -decels, cat I
baseline 125; moderate variability; +accels; -decels; category I tracing
baseline 140; moderate variability; +accels; -decels; category I tracing
baseline 135; moderate variability; +accels; -decels; category I tracing
baseline 145, moderate variability, +accels, no decels.
baseline 150 , moderate variability, +accels, no decels.
baseline 125; moderate variability; +accels; non recurrent variable decels; category II tracing overall reassuring with accels and moderate variability
baseline 125; moderate variability; -accels; -decels; category I tracing

## 2024-03-22 NOTE — OB RN DELIVERY SUMMARY - NSSELHIDDEN_OBGYN_ALL_OB_FT
[NS_DeliveryAttending1_OBGYN_ALL_OB_FT:[id number]],[NS_DeliveryAssist1_OBGYN_ALL_OB_FT:[id number]],[NS_DeliveryRN_OBGYN_ALL_OB_FT:[id number]],[NS_CirculateRN2_OBGYN_ALL_OB_FT:[id number]]

## 2024-03-22 NOTE — LACTATION INITIAL EVALUATION - LACTATION INTERVENTIONS
initiate/review safe skin-to-skin/initiate/review hand expression/initiate/review pumping guidelines and safe milk handling/initiate/review techniques for position and latch/post discharge community resources provided/initiate/review supplementation plan due to medical indications/review techniques to increase milk supply/initiate/review alternate feeding method/reviewed components of an effective feeding and at least 8 effective feedings per day required/reviewed importance of monitoring infant diapers, the breastfeeding log, and minimum output each day/reviewed risks of unnecessary formula supplementation/reviewed risks of artificial nipples/reviewed benefits and recommendations for rooming in/reviewed feeding on demand/by cue at least 8 times a day/reviewed indications of inadequate milk transfer that would require supplementation

## 2024-03-23 ENCOUNTER — TRANSCRIPTION ENCOUNTER (OUTPATIENT)
Age: 34
End: 2024-03-23

## 2024-03-23 RX ORDER — ACETAMINOPHEN 500 MG
3 TABLET ORAL
Qty: 0 | Refills: 0 | DISCHARGE
Start: 2024-03-23

## 2024-03-23 RX ORDER — NIFEDIPINE 30 MG
30 TABLET, EXTENDED RELEASE 24 HR ORAL EVERY 24 HOURS
Refills: 0 | Status: DISCONTINUED | OUTPATIENT
Start: 2024-03-23 | End: 2024-03-24

## 2024-03-23 RX ORDER — IBUPROFEN 200 MG
1 TABLET ORAL
Qty: 0 | Refills: 0 | DISCHARGE
Start: 2024-03-23

## 2024-03-23 RX ADMIN — SODIUM CHLORIDE 3 MILLILITER(S): 9 INJECTION INTRAMUSCULAR; INTRAVENOUS; SUBCUTANEOUS at 07:05

## 2024-03-23 RX ADMIN — Medication 600 MILLIGRAM(S): at 11:49

## 2024-03-23 RX ADMIN — Medication 1 TABLET(S): at 11:49

## 2024-03-23 RX ADMIN — Medication 975 MILLIGRAM(S): at 09:44

## 2024-03-23 RX ADMIN — Medication 1 APPLICATION(S): at 20:30

## 2024-03-23 RX ADMIN — Medication 975 MILLIGRAM(S): at 14:50

## 2024-03-23 RX ADMIN — Medication 975 MILLIGRAM(S): at 03:32

## 2024-03-23 RX ADMIN — Medication 1 SPRAY(S): at 03:41

## 2024-03-23 RX ADMIN — Medication 1 SPRAY(S): at 20:30

## 2024-03-23 RX ADMIN — Medication 30 MILLIGRAM(S): at 15:30

## 2024-03-23 RX ADMIN — Medication 975 MILLIGRAM(S): at 10:15

## 2024-03-23 RX ADMIN — Medication 600 MILLIGRAM(S): at 12:20

## 2024-03-23 RX ADMIN — SODIUM CHLORIDE 3 MILLILITER(S): 9 INJECTION INTRAMUSCULAR; INTRAVENOUS; SUBCUTANEOUS at 14:25

## 2024-03-23 RX ADMIN — Medication 600 MILLIGRAM(S): at 06:15

## 2024-03-23 RX ADMIN — Medication 600 MILLIGRAM(S): at 17:26

## 2024-03-23 RX ADMIN — Medication 600 MILLIGRAM(S): at 18:00

## 2024-03-23 RX ADMIN — Medication 1 APPLICATION(S): at 03:41

## 2024-03-23 RX ADMIN — Medication 600 MILLIGRAM(S): at 00:08

## 2024-03-23 RX ADMIN — Medication 975 MILLIGRAM(S): at 20:30

## 2024-03-23 RX ADMIN — Medication 600 MILLIGRAM(S): at 23:12

## 2024-03-23 RX ADMIN — Medication 975 MILLIGRAM(S): at 14:18

## 2024-03-23 NOTE — DISCHARGE NOTE OB - PATIENT PORTAL LINK FT
You can access the FollowMyHealth Patient Portal offered by [facility] by registering at the following website: [email or web address]. By joining [employer]’s FollowMyHealth portal, you will also be able to view your health information using other applications (apps) compatible with our system.

## 2024-03-23 NOTE — DISCHARGE NOTE OB - CARE PLAN
1 Principal Discharge DX:	Preeclampsia  Assessment and plan of treatment:	Please follow up with your OB within 1-2 weeks for a blood pressure check. Check blood pressures at home 3x a day. If your blood pressure is ever greater than 160/110, you develop a headache not relieved by tylenol, visual disturbances, or right upper abdominal pain, call your doctor or the hospital, or go to your nearest emergency room right away.  Secondary Diagnosis:	Postpartum state  Assessment and plan of treatment:	Please follow-up with your OB doctor within 1-2 weeks. You can resume a regular diet at home and you should continue your prenatal vitamins as directed. You can take Motrin 600mg by mouth every 6 hours for pain as needed.    Please place nothing in the vagina for 6 weeks (no tampons, no sex, no douching, no baths, no hot tubs, no swimming pools, etc). If you have severe headaches and/or vision changes, heavy bleeding, chest pain, or shortness of breath, please call your provider or go to the nearest ED. Call your OB with any signs of symptoms of infection including fever > 100.4 degrees, severe pain, malodorous vaginal discharge or heavy bleeding requiring more than 1-2 pads/hour.   Principal Discharge DX:	Preeclampsia  Assessment and plan of treatment:	Please follow up with your OB within 1-2 weeks for a blood pressure check. Continue Nifedipine 30 mg daily. Check blood pressures at home 3x a day. If your blood pressure is ever greater than 160/110, you develop a headache not relieved by tylenol, visual disturbances, or right upper abdominal pain, call your doctor or the hospital, or go to your nearest emergency room right away.  Secondary Diagnosis:	Postpartum state  Assessment and plan of treatment:	Please follow-up with your OB doctor within 1-2 weeks. You can resume a regular diet at home and you should continue your prenatal vitamins as directed. You can take Motrin 600mg by mouth every 6 hours for pain as needed.    Please place nothing in the vagina for 6 weeks (no tampons, no sex, no douching, no baths, no hot tubs, no swimming pools, etc). If you have severe headaches and/or vision changes, heavy bleeding, chest pain, or shortness of breath, please call your provider or go to the nearest ED. Call your OB with any signs of symptoms of infection including fever > 100.4 degrees, severe pain, malodorous vaginal discharge or heavy bleeding requiring more than 1-2 pads/hour.

## 2024-03-23 NOTE — DISCHARGE NOTE OB - PLAN OF CARE
Please follow-up with your OB doctor within 1-2 weeks. You can resume a regular diet at home and you should continue your prenatal vitamins as directed. You can take Motrin 600mg by mouth every 6 hours for pain as needed.    Please place nothing in the vagina for 6 weeks (no tampons, no sex, no douching, no baths, no hot tubs, no swimming pools, etc). If you have severe headaches and/or vision changes, heavy bleeding, chest pain, or shortness of breath, please call your provider or go to the nearest ED. Call your OB with any signs of symptoms of infection including fever > 100.4 degrees, severe pain, malodorous vaginal discharge or heavy bleeding requiring more than 1-2 pads/hour. Please follow up with your OB within 1-2 weeks for a blood pressure check. Check blood pressures at home 3x a day. If your blood pressure is ever greater than 160/110, you develop a headache not relieved by tylenol, visual disturbances, or right upper abdominal pain, call your doctor or the hospital, or go to your nearest emergency room right away. Please follow up with your OB within 1-2 weeks for a blood pressure check. Continue Nifedipine 30 mg daily. Check blood pressures at home 3x a day. If your blood pressure is ever greater than 160/110, you develop a headache not relieved by tylenol, visual disturbances, or right upper abdominal pain, call your doctor or the hospital, or go to your nearest emergency room right away.

## 2024-03-23 NOTE — DISCHARGE NOTE OB - MEDICATION SUMMARY - MEDICATIONS TO TAKE
I will START or STAY ON the medications listed below when I get home from the hospital:    ibuprofen 600 mg oral tablet  -- 1 tab(s) by mouth every 6 hours  -- Indication: For Pain    acetaminophen 325 mg oral tablet  -- 3 tab(s) by mouth every 6 hours  -- Indication: For Pain    Prenatal Multivitamins with Folic Acid 1 mg oral tablet  -- 1 tab(s) by mouth once a day  -- Indication: For Postpartum state   I will START or STAY ON the medications listed below when I get home from the hospital:    ibuprofen 600 mg oral tablet  -- 1 tab(s) by mouth every 6 hours  -- Indication: For Pain    acetaminophen 325 mg oral tablet  -- 3 tab(s) by mouth every 6 hours  -- Indication: For Pain    NIFEdipine 30 mg oral tablet, extended release  -- 1 tab(s) by mouth every 24 hours  -- Indication: For hypertension    Prenatal Multivitamins with Folic Acid 1 mg oral tablet  -- 1 tab(s) by mouth once a day  -- Indication: For Postpartum state   I will START or STAY ON the medications listed below when I get home from the hospital:    ibuprofen 600 mg oral tablet  -- 1 tab(s) by mouth every 6 hours  -- Indication: For Pain    acetaminophen 325 mg oral tablet  -- 3 tab(s) by mouth every 6 hours  -- Indication: For Pain    NIFEdipine 30 mg oral tablet, extended release  -- 1 tab(s) by mouth every 24 hours  -- Indication: For BP medication    Prenatal Multivitamins with Folic Acid 1 mg oral tablet  -- 1 tab(s) by mouth once a day  -- Indication: For Postpartum state

## 2024-03-23 NOTE — PROGRESS NOTE ADULT - ATTENDING COMMENTS
Agree with the above, patient seen and examined by me.  S/p magnesium, denies toxic complaints. PPD#1, AFVSS, benign exam and meeting all PP milestones. Continue routine PP advances with anticipated d/c home tomorrow.     Marie Luz MD

## 2024-03-23 NOTE — DISCHARGE NOTE OB - HOSPITAL COURSE
Pt is a 33yF s/p  c/b PEC w/ SF (BPs) s/p IV Mag.  Please see delivery note for details.  During postpartum course patient's vitals were stabilized without BP meds, vaginal bleeding appropriate, and pain well controlled.  On day of discharge patient was ambulating, pt had adequate oral intake, and was voiding freely.  Discharge instructions and precautions were given.  Will return to clinic in 1-2 weeks for postpartum visit and BP check. Pt is a 33yF s/p  c/b PEC w/ SF (BPs) s/p IV Mag.  Please see delivery note for details.  During postpartum course patient's vitals were stabilized with Nifedipine 30mg daily.  Vaginal bleeding appropriate, and pain well controlled.  On day of discharge patient was ambulating, pt had adequate oral intake, and was voiding freely.  Discharge instructions and precautions were given.  Will return to clinic in 1-2 weeks for postpartum visit and BP check.

## 2024-03-23 NOTE — DISCHARGE NOTE OB - NS MD DC FALL RISK RISK
For information on Fall & Injury Prevention, visit: [email or web address] OR  [email or web address] OR  https://www.cdc.gov/steadi/patient.html

## 2024-03-23 NOTE — DISCHARGE NOTE OB - CARE PROVIDER_API CALL
Nancy Mantilla  Obstetrics and Gynecology  [address]  [place] [address]  Phone: [phone]  Fax: [phone]  Follow Up Time:

## 2024-03-24 ENCOUNTER — NON-APPOINTMENT (OUTPATIENT)
Age: 34
End: 2024-03-24

## 2024-03-24 VITALS
SYSTOLIC BLOOD PRESSURE: 116 MMHG | OXYGEN SATURATION: 100 % | DIASTOLIC BLOOD PRESSURE: 80 MMHG | TEMPERATURE: 98 F | HEART RATE: 77 BPM | RESPIRATION RATE: 18 BRPM

## 2024-03-24 PROCEDURE — 84156 ASSAY OF PROTEIN URINE: CPT

## 2024-03-24 PROCEDURE — 83735 ASSAY OF MAGNESIUM: CPT

## 2024-03-24 PROCEDURE — 59050 FETAL MONITOR W/REPORT: CPT

## 2024-03-24 PROCEDURE — 80053 COMPREHEN METABOLIC PANEL: CPT

## 2024-03-24 PROCEDURE — 36415 COLL VENOUS BLD VENIPUNCTURE: CPT

## 2024-03-24 PROCEDURE — 86901 BLOOD TYPING SEROLOGIC RH(D): CPT

## 2024-03-24 PROCEDURE — 82570 ASSAY OF URINE CREATININE: CPT

## 2024-03-24 PROCEDURE — 85025 COMPLETE CBC W/AUTO DIFF WBC: CPT

## 2024-03-24 PROCEDURE — 83615 LACTATE (LD) (LDH) ENZYME: CPT

## 2024-03-24 PROCEDURE — 86850 RBC ANTIBODY SCREEN: CPT

## 2024-03-24 PROCEDURE — 86900 BLOOD TYPING SEROLOGIC ABO: CPT

## 2024-03-24 PROCEDURE — 84550 ASSAY OF BLOOD/URIC ACID: CPT

## 2024-03-24 PROCEDURE — 86780 TREPONEMA PALLIDUM: CPT

## 2024-03-24 PROCEDURE — 85730 THROMBOPLASTIN TIME PARTIAL: CPT

## 2024-03-24 PROCEDURE — 85027 COMPLETE CBC AUTOMATED: CPT

## 2024-03-24 PROCEDURE — 85384 FIBRINOGEN ACTIVITY: CPT

## 2024-03-24 PROCEDURE — 85610 PROTHROMBIN TIME: CPT

## 2024-03-24 RX ORDER — NIFEDIPINE 30 MG
1 TABLET, EXTENDED RELEASE 24 HR ORAL
Qty: 30 | Refills: 0
Start: 2024-03-24 | End: 2024-04-22

## 2024-03-24 RX ORDER — NIFEDIPINE 30 MG
1 TABLET, EXTENDED RELEASE 24 HR ORAL
Qty: 0 | Refills: 0 | DISCHARGE
Start: 2024-03-24

## 2024-03-24 RX ADMIN — Medication 1 TABLET(S): at 12:36

## 2024-03-24 RX ADMIN — Medication 600 MILLIGRAM(S): at 05:20

## 2024-03-24 RX ADMIN — Medication 975 MILLIGRAM(S): at 09:25

## 2024-03-24 RX ADMIN — Medication 600 MILLIGRAM(S): at 12:37

## 2024-03-24 RX ADMIN — SODIUM CHLORIDE 3 MILLILITER(S): 9 INJECTION INTRAMUSCULAR; INTRAVENOUS; SUBCUTANEOUS at 14:21

## 2024-03-24 RX ADMIN — SODIUM CHLORIDE 3 MILLILITER(S): 9 INJECTION INTRAMUSCULAR; INTRAVENOUS; SUBCUTANEOUS at 08:00

## 2024-03-24 RX ADMIN — Medication 975 MILLIGRAM(S): at 15:03

## 2024-03-24 RX ADMIN — Medication 975 MILLIGRAM(S): at 02:29

## 2024-03-24 RX ADMIN — Medication 30 MILLIGRAM(S): at 15:03

## 2024-03-24 NOTE — PROGRESS NOTE ADULT - SUBJECTIVE AND OBJECTIVE BOX
Patient evaluated at bedside for clinical magnesium check. Serum magnesium drawn at 0430  Patient denies visual disturbances including scotoma, headache, RUQ  pain, nausea, vomiting, epigastric pain, shortness of breath. Pain is controlled.      T(C): 36.4 (24 @ 01:00), Max: 36.9 (24 @ 20:15)  HR: 86 (24 @ 21:40) (86 - 86)  BP: 137/76 (24 @ 21:40) (137/76 - 137/76)  RR: 20 (24 @ 21:40) (20 - 20)  SpO2: 100% (24 @ 21:40) (100% - 100%)  Wt(kg): --  Daily Height in cm: 160.02 (21 Mar 2024 21:40)    Daily Weight Pre-pregnancy in k (21 Mar 2024 15:00)     @ 07:01  -   @ 04:13  --------------------------------------------------------  IN: 0 mL / OUT: 120 mL / NET: -120 mL      Gen: no apparent distress  Cardiac : regular rate and rhythm; no murmurs  Pulm: no increased work of breathing; clear to auscultation bilaterally  Abd: soft, nontender, no rebound or guarding, no epigastric tenderness, liver nonpalpable, fundus firm  Ext: trace pedal edema bilaterally; Reflexes 2+ and symmetric bilaterally                          10.4   8.17  )-----------( 209      ( 21 Mar 2024 12:15 )             32.4         136  |  104  |  6<L>  ----------------------------<  92  4.1   |  20<L>  |  0.59    Ca    9.6      21 Mar 2024 12:15    TPro  7.5  /  Alb  4.0  /  TBili  0.3  /  DBili  x   /  AST  31  /  ALT  36  /  AlkPhos  192<H>      acetaminophen     Tablet .. 975 milliGRAM(s) Oral <User Schedule>  benzocaine 20%/menthol 0.5% Spray 1 Spray(s) Topical every 6 hours PRN  dibucaine 1% Ointment 1 Application(s) Topical every 6 hours PRN  diphenhydrAMINE 25 milliGRAM(s) Oral every 6 hours PRN  diphtheria/tetanus/pertussis (acellular) Vaccine (Adacel) 0.5 milliLiter(s) IntraMuscular once  fentanyl (2 MICROgram(s)/mL) + bupivacaine 0.0625%  in 0.9% Sodium Chloride PCEA 250 milliLiter(s) Epidural PCA Continuous  hydrocortisone 1% Cream 1 Application(s) Topical every 6 hours PRN  ibuprofen  Tablet. 600 milliGRAM(s) Oral every 6 hours  ketorolac   Injectable 30 milliGRAM(s) IV Push once  lanolin Ointment 1 Application(s) Topical every 6 hours PRN  magnesium hydroxide Suspension 30 milliLiter(s) Oral two times a day PRN  magnesium sulfate Infusion 2 Gm/Hr IV Continuous <Continuous>  oxyCODONE    IR 5 milliGRAM(s) Oral every 3 hours PRN  oxyCODONE    IR 5 milliGRAM(s) Oral once PRN  oxytocin Infusion 41.667 milliUNIT(s)/Min IV Continuous <Continuous>  pramoxine 1%/zinc 5% Cream 1 Application(s) Topical every 4 hours PRN  prenatal multivitamin 1 Tablet(s) Oral daily  simethicone 80 milliGRAM(s) Chew every 4 hours PRN  sodium chloride 0.9% lock flush 3 milliLiter(s) IV Push every 8 hours  witch hazel Pads 1 Application(s) Topical every 4 hours PRN      24 @ 07:01  -  24 @ 04:13  --------------------------------------------------------  IN: 0 mL / OUT: 120 mL / NET: -120 mL                    
Patient evaluated at bedside for clinical magnesium check. Serum magnesium to be drawn at 1600. She denies visual disturbances including scotoma, headache and right upper quadrant pain. Also denies nausea/vomiting/epigastric pain/shortness of breath. Pain well controlled. Patient reports feeling overall fatigued.      T(C): 36.6 (03-22-24 @ 15:56), Max: 36.7 (03-22-24 @ 12:00)  HR: 101 (03-22-24 @ 15:56) (81 - 120)  BP: 146/94 (03-22-24 @ 15:56) (119/88 - 146/94)  RR: 18 (03-22-24 @ 15:56) (16 - 18)  SpO2: 97% (03-22-24 @ 15:56) (96% - 100%)  Wt(kg): --  Daily Height in cm: 160.02 (21 Mar 2024 21:40)    Daily Baby A: Weight (gm) Delivery: 2470 (22 Mar 2024 05:00)    03-21 @ 07:01  -  03-22 @ 07:00  --------------------------------------------------------  IN: 0 mL / OUT: 1345 mL / NET: -1345 mL    03-22 @ 07:01  -  03-22 @ 16:30  --------------------------------------------------------  IN: 750 mL / OUT: 2030 mL / NET: -1280 mL      Gen: well-appearing, NAD  CV: RRR, no M/R/G  Pulm: CTAB, no crackles or rhonchi  Abd: soft, nontender, no rebound or guarding, no epigastric tenderness, liver nonpalpable, fundus palpated   : Kaminski in place  Ext: +1 edema aspen, SCDs in place, BR reflexes +2                           9.2    17.51 )-----------( 199      ( 22 Mar 2024 04:24 )             29.5     03-22    138  |  107  |  6<L>  ----------------------------<  162<H>  4.5   |  17<L>  |  0.57    Ca    7.8<L>      22 Mar 2024 04:24  Mg     6.8     03-22    TPro  6.2  /  Alb  3.5  /  TBili  0.4  /  DBili  x   /  AST  37  /  ALT  39  /  AlkPhos  172<H>  03-22    acetaminophen     Tablet .. 975 milliGRAM(s) Oral <User Schedule>  benzocaine 20%/menthol 0.5% Spray 1 Spray(s) Topical every 6 hours PRN  dibucaine 1% Ointment 1 Application(s) Topical every 6 hours PRN  diphenhydrAMINE 25 milliGRAM(s) Oral every 6 hours PRN  diphtheria/tetanus/pertussis (acellular) Vaccine (Adacel) 0.5 milliLiter(s) IntraMuscular once  fentanyl (2 MICROgram(s)/mL) + bupivacaine 0.0625%  in 0.9% Sodium Chloride PCEA 250 milliLiter(s) Epidural PCA Continuous  hydrocortisone 1% Cream 1 Application(s) Topical every 6 hours PRN  ibuprofen  Tablet. 600 milliGRAM(s) Oral every 6 hours  lactated ringers. 1000 milliLiter(s) IV Continuous <Continuous>  lanolin Ointment 1 Application(s) Topical every 6 hours PRN  magnesium hydroxide Suspension 30 milliLiter(s) Oral two times a day PRN  magnesium sulfate Infusion 1.5 Gm/Hr IV Continuous <Continuous>  oxyCODONE    IR 5 milliGRAM(s) Oral every 3 hours PRN  oxyCODONE    IR 5 milliGRAM(s) Oral once PRN  oxytocin Infusion 41.667 milliUNIT(s)/Min IV Continuous <Continuous>  pramoxine 1%/zinc 5% Cream 1 Application(s) Topical every 4 hours PRN  prenatal multivitamin 1 Tablet(s) Oral daily  simethicone 80 milliGRAM(s) Chew every 4 hours PRN  sodium chloride 0.9% lock flush 3 milliLiter(s) IV Push every 8 hours  witch hazel Pads 1 Application(s) Topical every 4 hours PRN      03-21-24 @ 07:01  -  03-22-24 @ 07:00  --------------------------------------------------------  IN: 0 mL / OUT: 1345 mL / NET: -1345 mL    03-22-24 @ 07:01  -  03-22-24 @ 16:30  --------------------------------------------------------  IN: 750 mL / OUT: 2030 mL / NET: -1280 mL                    
Patient evaluated at bedside for clinical magnesium check. Serum magnesium to be drawn at 2230  Patient denies visual disturbances including scotoma, headache, RUQ  pain, nausea, vomiting, epigastric pain, shortness of breath. Pain is controlled.      T(C): 36.2 (03-22-24 @ 18:13), Max: 36.7 (03-22-24 @ 12:00)  HR: 90 (03-22-24 @ 18:13) (81 - 101)  BP: 144/85 (03-22-24 @ 18:13) (122/81 - 146/94)  RR: 17 (03-22-24 @ 18:13) (17 - 18)  SpO2: 97% (03-22-24 @ 18:13) (97% - 99%)  Wt(kg): --  Daily     Daily Baby A: Weight (gm) Delivery: 2470 (22 Mar 2024 05:00)    03-22 @ 07:01  -  03-22 @ 21:54  --------------------------------------------------------  IN: 1500 mL / OUT: 2605 mL / NET: -1105 mL      Gen: no apparent distress  Cardiac : regular rate and rhythm; no murmurs  Pulm: no increased work of breathing; clear to auscultation bilaterally  Abd: soft, nontender, no rebound or guarding, no epigastric tenderness, liver nonpalpable, fundus firm  Ext: trace pedal edema bilaterally; Reflexes 2+ and symmetric  bilaterally                          8.0    13.53 )-----------( 191      ( 22 Mar 2024 16:07 )             25.3     03-22    138  |  107  |  5<L>  ----------------------------<  126<H>  4.1   |  23  |  0.61    Ca    6.7<L>      22 Mar 2024 16:07  Mg     6.1     03-22    TPro  5.7<L>  /  Alb  3.2<L>  /  TBili  0.2  /  DBili  x   /  AST  34  /  ALT  38  /  AlkPhos  146<H>  03-22    acetaminophen     Tablet .. 975 milliGRAM(s) Oral <User Schedule>  benzocaine 20%/menthol 0.5% Spray 1 Spray(s) Topical every 6 hours PRN  dibucaine 1% Ointment 1 Application(s) Topical every 6 hours PRN  diphenhydrAMINE 25 milliGRAM(s) Oral every 6 hours PRN  diphtheria/tetanus/pertussis (acellular) Vaccine (Adacel) 0.5 milliLiter(s) IntraMuscular once  fentanyl (2 MICROgram(s)/mL) + bupivacaine 0.0625%  in 0.9% Sodium Chloride PCEA 250 milliLiter(s) Epidural PCA Continuous  hydrocortisone 1% Cream 1 Application(s) Topical every 6 hours PRN  ibuprofen  Tablet. 600 milliGRAM(s) Oral every 6 hours  lactated ringers. 1000 milliLiter(s) IV Continuous <Continuous>  lanolin Ointment 1 Application(s) Topical every 6 hours PRN  magnesium hydroxide Suspension 30 milliLiter(s) Oral two times a day PRN  magnesium sulfate Infusion 1.5 Gm/Hr IV Continuous <Continuous>  oxyCODONE    IR 5 milliGRAM(s) Oral every 3 hours PRN  oxyCODONE    IR 5 milliGRAM(s) Oral once PRN  oxytocin Infusion 41.667 milliUNIT(s)/Min IV Continuous <Continuous>  pramoxine 1%/zinc 5% Cream 1 Application(s) Topical every 4 hours PRN  prenatal multivitamin 1 Tablet(s) Oral daily  simethicone 80 milliGRAM(s) Chew every 4 hours PRN  sodium chloride 0.9% lock flush 3 milliLiter(s) IV Push every 8 hours  witch hazel Pads 1 Application(s) Topical every 4 hours PRN      03-21-24 @ 07:01  -  03-22-24 @ 07:00  --------------------------------------------------------  IN: 0 mL / OUT: 1345 mL / NET: -1345 mL    03-22-24 @ 07:01  -  03-22-24 @ 21:54  --------------------------------------------------------  IN: 1500 mL / OUT: 2605 mL / NET: -1105 mL                    
Patient evaluated at bedside this morning, resting comfortable in bed, no acute events overnight.  She reports pain is well controlled with tylenol and motrin.  She denies headache, dizziness, chest pain, palpitations, shortness of breath, nausea, vomiting, fever, chills, heavy vaginal bleeding. She has been ambulating without assistance, voiding spontaneously.  Tolerating food well, without nausea/vomit.      Physical Exam:  T(C): 36.7 (03-24-24 @ 01:50), Max: 36.9 (03-23-24 @ 18:00)  HR: 71 (03-24-24 @ 01:50) (65 - 71)  BP: 129/80 (03-24-24 @ 01:50) (129/80 - 143/93)  RR: 18 (03-24-24 @ 01:50) (18 - 18)  SpO2: 99% (03-24-24 @ 01:50) (99% - 99%)    GA: lying comfortably in bed, NAD  Pulm: no increased work of breathing  Abd: soft, nontender, nondistended, no rebound or guarding, uterus firm.  Extremities: no calf tenderness                          8.0    13.53 )-----------( 191      ( 22 Mar 2024 16:07 )             25.3     03-22    138  |  107  |  5<L>  ----------------------------<  126<H>  4.1   |  23  |  0.61    Ca    6.7<L>      22 Mar 2024 16:07  Mg     6.2     03-22    TPro  5.7<L>  /  Alb  3.2<L>  /  TBili  0.2  /  DBili  x   /  AST  34  /  ALT  38  /  AlkPhos  146<H>  03-22    acetaminophen     Tablet .. 975 milliGRAM(s) Oral <User Schedule>  benzocaine 20%/menthol 0.5% Spray 1 Spray(s) Topical every 6 hours PRN  dibucaine 1% Ointment 1 Application(s) Topical every 6 hours PRN  diphenhydrAMINE 25 milliGRAM(s) Oral every 6 hours PRN  diphtheria/tetanus/pertussis (acellular) Vaccine (Adacel) 0.5 milliLiter(s) IntraMuscular once  hydrocortisone 1% Cream 1 Application(s) Topical every 6 hours PRN  ibuprofen  Tablet. 600 milliGRAM(s) Oral every 6 hours  lanolin Ointment 1 Application(s) Topical every 6 hours PRN  magnesium hydroxide Suspension 30 milliLiter(s) Oral two times a day PRN  NIFEdipine XL 30 milliGRAM(s) Oral every 24 hours  oxyCODONE    IR 5 milliGRAM(s) Oral every 3 hours PRN  oxyCODONE    IR 5 milliGRAM(s) Oral once PRN  oxytocin Infusion 41.667 milliUNIT(s)/Min IV Continuous <Continuous>  pramoxine 1%/zinc 5% Cream 1 Application(s) Topical every 4 hours PRN  prenatal multivitamin 1 Tablet(s) Oral daily  simethicone 80 milliGRAM(s) Chew every 4 hours PRN  sodium chloride 0.9% lock flush 3 milliLiter(s) IV Push every 8 hours  witch hazel Pads 1 Application(s) Topical every 4 hours PRN  
Patient evaluated at bedside for clinical magnesium check. Serum magnesium to be drawn at 1030.  She denies visual disturbances including scotoma, headache and right upper quadrant pain. Also denies nausea/vomiting/epigastric pain/shortness of breath. Pain well controlled.      T(C): 36.7 (03-22-24 @ 14:00), Max: 36.7 (03-22-24 @ 04:09)  HR: 84 (03-22-24 @ 14:00) (81 - 120)  BP: 122/81 (03-22-24 @ 14:00) (119/88 - 140/87)  RR: 17 (03-22-24 @ 14:00) (16 - 18)  SpO2: 97% (03-22-24 @ 14:00) (96% - 100%)  Wt(kg): --  Daily Height in cm: 160.02 (21 Mar 2024 21:40)    Daily Baby A: Weight (gm) Delivery: 2470 (22 Mar 2024 05:00)    03-21 @ 07:01  -  03-22 @ 07:00  --------------------------------------------------------  IN: 0 mL / OUT: 1345 mL / NET: -1345 mL    03-22 @ 07:01  -  03-22 @ 15:05  --------------------------------------------------------  IN: 750 mL / OUT: 2030 mL / NET: -1280 mL      Gen: well-appearing, NAD  CV: RRR, no M/R/G  Pulm: CTAB, no crackles or rhonchi  Abd: soft, nontender, no rebound or guarding, no epigastric tenderness, liver nonpalpable, fundus palpated   : Kaminski in place  Ext: +1 edema aspen, SCDs in place, BR reflexes 2+ bilaterally                           9.2    17.51 )-----------( 199      ( 22 Mar 2024 04:24 )             29.5     03-22    138  |  107  |  6<L>  ----------------------------<  162<H>  4.5   |  17<L>  |  0.57    Ca    7.8<L>      22 Mar 2024 04:24  Mg     6.8     03-22    TPro  6.2  /  Alb  3.5  /  TBili  0.4  /  DBili  x   /  AST  37  /  ALT  39  /  AlkPhos  172<H>  03-22    acetaminophen     Tablet .. 975 milliGRAM(s) Oral <User Schedule>  benzocaine 20%/menthol 0.5% Spray 1 Spray(s) Topical every 6 hours PRN  dibucaine 1% Ointment 1 Application(s) Topical every 6 hours PRN  diphenhydrAMINE 25 milliGRAM(s) Oral every 6 hours PRN  diphtheria/tetanus/pertussis (acellular) Vaccine (Adacel) 0.5 milliLiter(s) IntraMuscular once  fentanyl (2 MICROgram(s)/mL) + bupivacaine 0.0625%  in 0.9% Sodium Chloride PCEA 250 milliLiter(s) Epidural PCA Continuous  hydrocortisone 1% Cream 1 Application(s) Topical every 6 hours PRN  ibuprofen  Tablet. 600 milliGRAM(s) Oral every 6 hours  lactated ringers. 1000 milliLiter(s) IV Continuous <Continuous>  lanolin Ointment 1 Application(s) Topical every 6 hours PRN  magnesium hydroxide Suspension 30 milliLiter(s) Oral two times a day PRN  magnesium sulfate Infusion 1.5 Gm/Hr IV Continuous <Continuous>  oxyCODONE    IR 5 milliGRAM(s) Oral every 3 hours PRN  oxyCODONE    IR 5 milliGRAM(s) Oral once PRN  oxytocin Infusion 41.667 milliUNIT(s)/Min IV Continuous <Continuous>  pramoxine 1%/zinc 5% Cream 1 Application(s) Topical every 4 hours PRN  prenatal multivitamin 1 Tablet(s) Oral daily  simethicone 80 milliGRAM(s) Chew every 4 hours PRN  sodium chloride 0.9% lock flush 3 milliLiter(s) IV Push every 8 hours  witch hazel Pads 1 Application(s) Topical every 4 hours PRN      03-21-24 @ 07:01  -  03-22-24 @ 07:00  --------------------------------------------------------  IN: 0 mL / OUT: 1345 mL / NET: -1345 mL    03-22-24 @ 07:01  -  03-22-24 @ 15:05  --------------------------------------------------------  IN: 750 mL / OUT: 2030 mL / NET: -1280 mL                    
Patient evaluated at bedside this morning, resting comfortable in bed, no acute events overnight.  She reports pain is well controlled with tylenol and motrin.  She denies headache, dizziness, chest pain, palpitations, shortness of breath, nausea, vomiting, fever, chills, heavy vaginal bleeding. She has not yet voided spontaneously as lange catheter was just removed.  Tolerating food well, without nausea/vomit.      Physical Exam:  T(C): 36.7 (03-23-24 @ 06:32), Max: 36.8 (03-22-24 @ 22:02)  HR: 82 (03-23-24 @ 06:32) (78 - 99)  BP: 127/84 (03-23-24 @ 06:32) (125/77 - 135/84)  RR: 18 (03-23-24 @ 06:32) (17 - 18)  SpO2: 97% (03-23-24 @ 06:32) (96% - 99%)    GA: NAD, A&O x 3  Pulm: no increased work of breathing  Abd: soft, nontender, nondistended, no rebound or guarding, uterus firm.  Extremities: no swelling or calf tenderness                          8.0    13.53 )-----------( 191      ( 22 Mar 2024 16:07 )             25.3     03-22    138  |  107  |  5<L>  ----------------------------<  126<H>  4.1   |  23  |  0.61    Ca    6.7<L>      22 Mar 2024 16:07  Mg     6.2     03-22    TPro  5.7<L>  /  Alb  3.2<L>  /  TBili  0.2  /  DBili  x   /  AST  34  /  ALT  38  /  AlkPhos  146<H>  03-22    acetaminophen     Tablet .. 975 milliGRAM(s) Oral <User Schedule>  benzocaine 20%/menthol 0.5% Spray 1 Spray(s) Topical every 6 hours PRN  dibucaine 1% Ointment 1 Application(s) Topical every 6 hours PRN  diphenhydrAMINE 25 milliGRAM(s) Oral every 6 hours PRN  diphtheria/tetanus/pertussis (acellular) Vaccine (Adacel) 0.5 milliLiter(s) IntraMuscular once  fentanyl (2 MICROgram(s)/mL) + bupivacaine 0.0625%  in 0.9% Sodium Chloride PCEA 250 milliLiter(s) Epidural PCA Continuous  hydrocortisone 1% Cream 1 Application(s) Topical every 6 hours PRN  ibuprofen  Tablet. 600 milliGRAM(s) Oral every 6 hours  lanolin Ointment 1 Application(s) Topical every 6 hours PRN  magnesium hydroxide Suspension 30 milliLiter(s) Oral two times a day PRN  oxyCODONE    IR 5 milliGRAM(s) Oral every 3 hours PRN  oxyCODONE    IR 5 milliGRAM(s) Oral once PRN  oxytocin Infusion 41.667 milliUNIT(s)/Min IV Continuous <Continuous>  pramoxine 1%/zinc 5% Cream 1 Application(s) Topical every 4 hours PRN  prenatal multivitamin 1 Tablet(s) Oral daily  simethicone 80 milliGRAM(s) Chew every 4 hours PRN  sodium chloride 0.9% lock flush 3 milliLiter(s) IV Push every 8 hours  witch hazel Pads 1 Application(s) Topical every 4 hours PRN

## 2024-03-24 NOTE — PROGRESS NOTE ADULT - ASSESSMENT
A&P:  32 yo  at 37w0d undergoing IOL for PEC w/o SF, developed SF (BP) ()   No complaints currently. No severe range BP.  Antihypertensives: none   Mg level is 6.8    - Continue IV Magnesium @1.5g/hr until 0400  - Continue to monitor blood pressures  - Follow up on Magnesium serum levels. Next Magnesium check @ 2330  - : strict Is and Os, D/C lange after discontinuation of IV Magnesium        
A/P 33y s/p  complicated by PEC with severe features s/p IV magnesium, PPD2 , stable, meeting postpartum milestones   - PEC with severe features s/p IV magnesium. Currently on nifedipine 30 mg daily. Patient is normotensive. Denies toxic complaints.   - Pain: well controlled on tylenol/motrin  - GI: Tolerating regular diet  - : urinating without difficulty/pain  - DVT prophylaxis: ambulating frequently  - Dispo: PPD 2, unless otherwise specified    
A&P:   33y  s/p  complicated by preeclampsia with severe features  No complaints currently. No severe range BP.  Antihypertensives:   Mg level is     - Continue IV Magnesium @2G/hr until 4:00am on 3/23/24.   - Continue to monitor blood pressures  - Follow up on Magnesium serum levels.   - GI: Clears, until Magnesium is stopped  - : strict Is and Os, D/C lange after discontinuation of IV Magnesium        
A&P:  33y  s/p  complicated by preeclampsia with severe features being blood pressure  No toxic signs or symptoms currently. No severe range BP.  Antihypertensives: none  Mg level is pending    - Continue IV Magnesium @2g/hr until 0400 3/23  - Continue to monitor blood pressures  - Follow up on Magnesium serum levels. Next Magnesium check at 0430, 1030  - GI: Regular diet  - : Strict I&Os; discontinue urinary catheter after discontinuation of IV Magnesium        
A/P 33y s/p , PPD1 w/ PEC w/ SF , stable, meeting postpartum milestones   - Pain: well controlled on tylenol/motrin  - GI: Tolerating regular diet  - : anisa removed this AM, follow up TOV  - DVT prophylaxis: SCDs in place  - Dispo: PPD 2, unless otherwise specified    PEC w/ SF (blood pressures)  -s/p IV Mg for 24 hours  -normotensive overnight  -no toxic complaints  
A&P:  33y  s/p  complicated by preeclampsia with severe features being blood pressure  No toxic signs or symptoms currently. No severe range BP.  Antihypertensives: none  Mg level is pending    - Continue IV Magnesium @2g/hr until 430  - Continue to monitor blood pressures  - Follow up on Magnesium serum levels. Next Magnesium check at    - GI: Regular diet  - : Strict I&Os; discontinue urinary catheter after discontinuation of IV Magnesium

## 2024-03-24 NOTE — PROGRESS NOTE ADULT - ATTENDING COMMENTS
PPD#2 s/p VD after IOL @ 36w6d gestation for PEC w/ SF being BPs, now s/p magnesium and on nifedipine 30 mg daily with good effect, normotensive now without toxic symptoms.  Meeting postpartum milestones. Benign PE.  Stable for d/c home. Has BP cuff and will check Bps at home.  Parameters and sxs to notify MD of reviewed and she will see me in the office within 1 week for BP check.    Nancy Mantilla MD

## 2024-03-25 ENCOUNTER — NON-APPOINTMENT (OUTPATIENT)
Age: 34
End: 2024-03-25

## 2024-03-26 ENCOUNTER — APPOINTMENT (OUTPATIENT)
Dept: OBGYN | Facility: CLINIC | Age: 34
End: 2024-03-26
Payer: COMMERCIAL

## 2024-03-26 ENCOUNTER — NON-APPOINTMENT (OUTPATIENT)
Age: 34
End: 2024-03-26

## 2024-03-26 VITALS
OXYGEN SATURATION: 99 % | SYSTOLIC BLOOD PRESSURE: 126 MMHG | WEIGHT: 136 LBS | BODY MASS INDEX: 25.03 KG/M2 | HEART RATE: 82 BPM | DIASTOLIC BLOOD PRESSURE: 80 MMHG | HEIGHT: 62 IN

## 2024-03-26 DIAGNOSIS — O14.90 UNSPECIFIED PRE-ECLAMPSIA, UNSPECIFIED TRIMESTER: ICD-10-CM

## 2024-03-26 DIAGNOSIS — O21.9 VOMITING OF PREGNANCY, UNSPECIFIED: ICD-10-CM

## 2024-03-26 DIAGNOSIS — Z34.03 ENCOUNTER FOR SUPERVISION OF NORMAL FIRST PREGNANCY, THIRD TRIMESTER: ICD-10-CM

## 2024-03-26 DIAGNOSIS — Z34.92 ENCOUNTER FOR SUPERVISION OF NORMAL PREGNANCY, UNSPECIFIED, SECOND TRIMESTER: ICD-10-CM

## 2024-03-26 PROCEDURE — 0503F POSTPARTUM CARE VISIT: CPT

## 2024-03-26 RX ORDER — ACETAMINOPHEN 500 MG/1
500 TABLET ORAL
Refills: 0 | Status: ACTIVE | COMMUNITY
Start: 2024-03-26

## 2024-03-26 RX ORDER — AMOXICILLIN AND CLAVULANATE POTASSIUM 875; 125 MG/1; MG/1
875-125 TABLET, COATED ORAL TWICE DAILY
Qty: 14 | Refills: 0 | Status: DISCONTINUED | COMMUNITY
Start: 2023-09-06 | End: 2024-03-26

## 2024-03-26 RX ORDER — IBUPROFEN 600 MG/1
600 TABLET, FILM COATED ORAL EVERY 6 HOURS
Refills: 0 | Status: ACTIVE | COMMUNITY
Start: 2024-03-26

## 2024-03-26 RX ORDER — NIFEDIPINE 30 MG/1
30 TABLET, EXTENDED RELEASE ORAL DAILY
Qty: 90 | Refills: 1 | Status: ACTIVE | COMMUNITY
Start: 2024-03-26 | End: 1900-01-01

## 2024-03-26 RX ORDER — PRENATAL VIT NO.126/IRON/FOLIC 28MG-0.8MG
28-0.8 TABLET ORAL DAILY
Refills: 0 | Status: ACTIVE | COMMUNITY
Start: 2024-03-26

## 2024-03-26 RX ORDER — DOXYLAMINE SUCCINATE AND PYRIDOXINE HYDROCHLORIDE 10; 10 MG/1; MG/1
10-10 TABLET, DELAYED RELEASE ORAL
Qty: 120 | Refills: 2 | Status: DISCONTINUED | COMMUNITY
Start: 2023-09-12 | End: 2024-03-26

## 2024-03-27 ENCOUNTER — NON-APPOINTMENT (OUTPATIENT)
Age: 34
End: 2024-03-27

## 2024-03-28 DIAGNOSIS — Z3A.36 36 WEEKS GESTATION OF PREGNANCY: ICD-10-CM

## 2024-03-28 LAB — B-HEM STREP SPEC QL CULT: NORMAL

## 2024-04-01 ENCOUNTER — NON-APPOINTMENT (OUTPATIENT)
Age: 34
End: 2024-04-01

## 2024-04-02 ENCOUNTER — APPOINTMENT (OUTPATIENT)
Dept: ANTEPARTUM | Facility: CLINIC | Age: 34
End: 2024-04-02

## 2024-04-02 ENCOUNTER — APPOINTMENT (OUTPATIENT)
Dept: OBGYN | Facility: CLINIC | Age: 34
End: 2024-04-02
Payer: COMMERCIAL

## 2024-04-02 VITALS
HEART RATE: 87 BPM | SYSTOLIC BLOOD PRESSURE: 143 MMHG | WEIGHT: 139 LBS | OXYGEN SATURATION: 99 % | BODY MASS INDEX: 25.42 KG/M2 | DIASTOLIC BLOOD PRESSURE: 86 MMHG

## 2024-04-02 DIAGNOSIS — B37.9 CANDIDIASIS, UNSPECIFIED: ICD-10-CM

## 2024-04-02 DIAGNOSIS — R10.2 PELVIC AND PERINEAL PAIN: ICD-10-CM

## 2024-04-02 DIAGNOSIS — N89.8 OTHER SPECIFIED NONINFLAMMATORY DISORDERS OF VAGINA: ICD-10-CM

## 2024-04-02 PROCEDURE — 0503F POSTPARTUM CARE VISIT: CPT

## 2024-04-15 ENCOUNTER — NON-APPOINTMENT (OUTPATIENT)
Age: 34
End: 2024-04-15

## 2024-04-15 LAB
CANDIDA VAG CYTO: NOT DETECTED
G VAGINALIS+PREV SP MTYP VAG QL MICRO: NOT DETECTED
T VAGINALIS VAG QL WET PREP: NOT DETECTED

## 2024-04-30 ENCOUNTER — APPOINTMENT (OUTPATIENT)
Dept: OBGYN | Facility: CLINIC | Age: 34
End: 2024-04-30

## 2024-04-30 VITALS
SYSTOLIC BLOOD PRESSURE: 114 MMHG | OXYGEN SATURATION: 93 % | BODY MASS INDEX: 24.84 KG/M2 | HEART RATE: 73 BPM | WEIGHT: 135 LBS | HEIGHT: 62 IN | DIASTOLIC BLOOD PRESSURE: 74 MMHG

## 2024-04-30 PROCEDURE — 0503F POSTPARTUM CARE VISIT: CPT

## 2024-06-06 ENCOUNTER — APPOINTMENT (OUTPATIENT)
Dept: HEART AND VASCULAR | Facility: CLINIC | Age: 34
End: 2024-06-06

## 2024-06-21 PROBLEM — Z34.03 NORMAL FIRST PREGNANCY IN THIRD TRIMESTER: Status: RESOLVED | Noted: 2024-03-05 | Resolved: 2024-06-21

## 2024-06-21 PROBLEM — Z34.92 NORMAL PREGNANCY IN SECOND TRIMESTER: Status: RESOLVED | Noted: 2023-10-17 | Resolved: 2024-06-21

## 2024-06-21 PROBLEM — O21.9 NAUSEA/VOMITING IN PREGNANCY: Status: RESOLVED | Noted: 2023-09-12 | Resolved: 2024-06-21

## 2024-06-21 NOTE — HISTORY OF PRESENT ILLNESS
[Delivery Date: ___] : on [unfilled] [] : delivered by vaginal delivery [Male] : Delivery History: baby boy [Wt. ___] : weighing [unfilled] [Breastfeeding] : currently nursing [Intended Contraception] : Intended Contraception: [BF with Difficulty] : nursing without difficulty [Resumed Menses] : has not resumed her menses [Resumed [place]] : has not resumed intercourse [None] : The patient is currently asymptomatic [Doing Well] : is doing well [No Sign of Infection] : is showing no signs of infection [Fair Pain Control] : has fair pain control [No [place]] : to avoid sexual intercourse [No Tampons/Suppositories] : against using tampons or vaginal suppositories [Limited ADLs] : to participate in activities of daily living with limitations [No Work] : not to work [No Housework] : not to do housework [No Sports] : not to participate in sports [FreeTextEntry8] : PRESENT FOR BP CHECK.- 126/80 [de-identified] : Here for BP check  [de-identified] : Will continue Nifedipine 30 mg for now and cont to monitor BPs.  Sxs to notify MD of again reviewed.

## 2024-06-22 NOTE — HISTORY OF PRESENT ILLNESS
[Delivery Date: ___] : on [unfilled] [] : delivered by vaginal delivery [Breastfeeding] : currently nursing [None] : No associated symptoms are reported [Normal] : the vagina was normal [Doing Well] : is doing well [No Sign of Infection] : is showing no signs of infection [Fair Pain Control] : has fair pain control [No [place]] : to avoid sexual intercourse [No Tampons/Suppositories] : against using tampons or vaginal suppositories [Limited ADLs] : to participate in activities of daily living with limitations [No Work] : not to work [No Housework] : not to do housework [No Sports] : not to participate in sports [BF with Difficulty] : nursing without difficulty [Vaginal Discharge] : vaginal discharge [FreeTextEntry8] : Here for pp vaginal and perineal pain.  Still having min amount of locia.  She has noticed some discharge, perhaps a fishy odor to it. [de-identified] : Vaginal and perineal pain, making it difficult to sit [de-identified] : 2nd degree laceration healing well.  No signs of breakdown or infection.  Vaginitis swab done.   [de-identified] : Pt reports vaginal discharge - vaginitis swab collected. [de-identified] : Discussed with pt using sitz bath, witch hazel wipes, donut pillow to sit on.  Cont taking Tylenol and Motrin for pain as needed.  Return for pp visit.  Further treatment pending results of vaginitis swab.

## 2024-06-25 NOTE — HISTORY OF PRESENT ILLNESS
[Delivery Date: ___] : on [unfilled] [] : delivered by vaginal delivery [Male] : Delivery History: baby boy [Wt. ___] : weighing [unfilled] [Breastfeeding] : currently nursing [Complications:___] : no complications [BF with Difficulty] : nursing without difficulty [Resumed Menses] : has not resumed her menses [Resumed [place]] : has not resumed intercourse [Intended Contraception] : the patient does not intended to use contraception postpartum [Abdominal Pain] : no abdominal pain [Back Pain] : no back pain [Breast Pain] : no breast pain [BreastFeeding Problems] : no breastfeeding problems [Chest Pain] : no chest pain [Cracked Nipples] : no cracked nipples [S/Sx PP Depression] : no signs/symptoms of postpartum depression [Heavy Bleeding] : no heavy bleeding [Irregular Bleeding] : no irregular bleeding [Leg Pain] : no leg pain [Shortness of Breath] : no shortness of breath [Suicidal Ideation] : no suicidal ideation [Vaginal Discharge] : no vaginal discharge [Chills] : no chills [Fatigue] : no fatigue [Dysuria] : no dysuria [Fever] : no fever [Headache] : no headache [Nausea] : no nausea [Vomiting] : no vomiting

## 2025-07-15 ENCOUNTER — APPOINTMENT (OUTPATIENT)
Dept: OBGYN | Facility: CLINIC | Age: 35
End: 2025-07-15

## 2025-07-15 ENCOUNTER — NON-APPOINTMENT (OUTPATIENT)
Age: 35
End: 2025-07-15

## 2025-07-15 VITALS
BODY MASS INDEX: 23.19 KG/M2 | DIASTOLIC BLOOD PRESSURE: 77 MMHG | OXYGEN SATURATION: 98 % | WEIGHT: 126 LBS | SYSTOLIC BLOOD PRESSURE: 114 MMHG | HEART RATE: 71 BPM | HEIGHT: 62 IN

## 2025-07-15 PROBLEM — Z01.419 WELL WOMAN EXAM WITH ROUTINE GYNECOLOGICAL EXAM: Status: ACTIVE | Noted: 2025-07-15

## 2025-07-15 PROBLEM — Z12.4 CERVICAL CANCER SCREENING: Status: ACTIVE | Noted: 2025-07-15

## 2025-07-15 PROBLEM — R39.9 UTI SYMPTOMS: Status: ACTIVE | Noted: 2025-07-15

## 2025-07-15 PROBLEM — Z12.39 BREAST SCREENING: Status: ACTIVE | Noted: 2025-07-15

## 2025-07-15 PROBLEM — N39.0 RECURRENT UTI: Status: ACTIVE | Noted: 2025-07-15

## 2025-07-15 PROCEDURE — 99459 PELVIC EXAMINATION: CPT

## 2025-07-15 PROCEDURE — 99395 PREV VISIT EST AGE 18-39: CPT

## 2025-07-18 LAB
BACTERIA UR CULT: NORMAL
HPV HIGH+LOW RISK DNA PNL CVX: NOT DETECTED

## 2025-07-21 ENCOUNTER — TRANSCRIPTION ENCOUNTER (OUTPATIENT)
Age: 35
End: 2025-07-21

## 2025-07-21 LAB — CYTOLOGY CVX/VAG DOC THIN PREP: NORMAL
